# Patient Record
Sex: MALE | Race: WHITE | NOT HISPANIC OR LATINO | Employment: OTHER
[De-identification: names, ages, dates, MRNs, and addresses within clinical notes are randomized per-mention and may not be internally consistent; named-entity substitution may affect disease eponyms.]

---

## 2022-11-20 ENCOUNTER — HOSPITAL ENCOUNTER (OUTPATIENT)
Dept: DATA CONVERSION | Age: 67
End: 2022-11-20

## 2023-10-13 ENCOUNTER — TELEMEDICINE (OUTPATIENT)
Dept: PRIMARY CARE | Facility: CLINIC | Age: 68
End: 2023-10-13
Payer: MEDICARE

## 2023-10-13 DIAGNOSIS — K57.92 DIVERTICULITIS: Primary | ICD-10-CM

## 2023-10-13 PROBLEM — Z22.7 LATENT TUBERCULOSIS BY BLOOD TEST: Status: ACTIVE | Noted: 2023-10-13

## 2023-10-13 PROBLEM — M06.9 RHEUMATOID ARTHRITIS OF HAND (MULTI): Status: ACTIVE | Noted: 2023-10-13

## 2023-10-13 PROBLEM — M65.30 ACQUIRED TRIGGER FINGER: Status: ACTIVE | Noted: 2023-10-13

## 2023-10-13 PROBLEM — Z96.649 S/P HIP REPLACEMENT: Status: ACTIVE | Noted: 2023-10-13

## 2023-10-13 PROBLEM — E78.2 MIXED HYPERLIPIDEMIA: Status: ACTIVE | Noted: 2023-10-13

## 2023-10-13 PROBLEM — R10.814 LLQ ABDOMINAL TENDERNESS: Status: ACTIVE | Noted: 2023-10-13

## 2023-10-13 PROBLEM — I48.0 PAROXYSMAL ATRIAL FIBRILLATION (MULTI): Status: ACTIVE | Noted: 2023-02-25

## 2023-10-13 PROBLEM — G25.0 BENIGN ESSENTIAL TREMOR: Status: ACTIVE | Noted: 2023-10-13

## 2023-10-13 PROBLEM — M47.817 LUMBOSACRAL SPONDYLOSIS WITHOUT MYELOPATHY: Status: ACTIVE | Noted: 2023-10-13

## 2023-10-13 PROBLEM — F17.200 TOBACCO USE DISORDER: Status: ACTIVE | Noted: 2023-10-13

## 2023-10-13 PROBLEM — J84.10 LUNG FIBROSIS (MULTI): Status: ACTIVE | Noted: 2023-10-13

## 2023-10-13 PROBLEM — M50.30 DEGENERATION OF CERVICAL INTERVERTEBRAL DISC: Status: ACTIVE | Noted: 2023-10-13

## 2023-10-13 PROBLEM — E55.9 VITAMIN D DEFICIENCY: Status: ACTIVE | Noted: 2023-10-13

## 2023-10-13 PROBLEM — F41.8 DEPRESSION WITH ANXIETY: Status: ACTIVE | Noted: 2023-10-13

## 2023-10-13 PROCEDURE — 99213 OFFICE O/P EST LOW 20 MIN: CPT | Performed by: FAMILY MEDICINE

## 2023-10-13 RX ORDER — ALBUTEROL SULFATE 0.83 MG/ML
2.5 SOLUTION RESPIRATORY (INHALATION) EVERY 4 HOURS PRN
COMMUNITY
End: 2024-04-08 | Stop reason: ALTCHOICE

## 2023-10-13 RX ORDER — CIPROFLOXACIN 500 MG/1
500 TABLET ORAL 2 TIMES DAILY
Qty: 14 TABLET | Refills: 0 | Status: SHIPPED | OUTPATIENT
Start: 2023-10-13 | End: 2023-10-20

## 2023-10-13 RX ORDER — PRIMIDONE 50 MG/1
50 TABLET ORAL NIGHTLY
COMMUNITY
End: 2023-12-20 | Stop reason: SDUPTHER

## 2023-10-13 RX ORDER — ETHAMBUTOL HYDROCHLORIDE 400 MG/1
1600 TABLET, FILM COATED ORAL DAILY
COMMUNITY

## 2023-10-13 RX ORDER — METRONIDAZOLE 500 MG/1
500 TABLET ORAL 2 TIMES DAILY
Qty: 14 TABLET | Refills: 0 | Status: SHIPPED | OUTPATIENT
Start: 2023-10-13 | End: 2023-10-20

## 2023-10-13 RX ORDER — RIFAMPIN 300 MG/1
1 CAPSULE ORAL 2 TIMES DAILY
COMMUNITY
Start: 2023-03-15

## 2023-10-13 RX ORDER — AZITHROMYCIN 500 MG/1
500 TABLET, FILM COATED ORAL DAILY
COMMUNITY
Start: 2023-03-15 | End: 2024-03-14

## 2023-10-13 RX ORDER — OXYCODONE AND ACETAMINOPHEN 7.5; 325 MG/1; MG/1
1 TABLET ORAL 4 TIMES DAILY
COMMUNITY
End: 2023-10-17 | Stop reason: SDUPTHER

## 2023-10-13 RX ORDER — APIXABAN 5 MG/1
5 TABLET, FILM COATED ORAL 2 TIMES DAILY
COMMUNITY
End: 2024-04-08 | Stop reason: ALTCHOICE

## 2023-10-13 NOTE — PROGRESS NOTES
Subjective   Patient ID: Salvador Ocampo is a 68 y.o. male who presents for No chief complaint on file..  HPI  Complains of LLQ abd pain x2d   Seen today  Presents with a complaint of a 2-day history of left lower quadrant pain.  Patient has a history of diverticulitis.  He developed some diarrhea.  No blood in the stool.  No mucus in the stool.  No nausea or vomiting fever or chills.  Denies any urinary symptoms.    Review of Systems   Constitutional:  Negative for appetite change, fever and unexpected weight change.   HENT:  Negative for congestion and trouble swallowing.    Eyes:  Negative for visual disturbance.   Respiratory:  Negative for shortness of breath.    Cardiovascular:  Negative for chest pain, palpitations and leg swelling.   Gastrointestinal:  Positive for diarrhea. Negative for blood in stool, constipation, nausea, rectal pain and vomiting.   Genitourinary:  Negative for difficulty urinating and hematuria.   Musculoskeletal:  Negative for gait problem and joint swelling.   Skin:  Negative for color change.   Allergic/Immunologic: Negative for immunocompromised state.   Neurological:  Negative for seizures and syncope.   Psychiatric/Behavioral:  Negative for confusion and suicidal ideas. The patient is not nervous/anxious.        Objective   There were no vitals taken for this visit.    Physical Exam    Assessment/Plan   Problem List Items Addressed This Visit       Diverticulitis - Primary    Relevant Medications    ciprofloxacin (Cipro) 500 mg tablet    metroNIDAZOLE (Flagyl) 500 mg tablet     Assessment:  -Rheumatoid arthritis: rheum following  -Atrial fibrillation:   -Essential tremor   -hx of latent TB  -hx of lumbar and cervical surgery  -melanoma: stage 1- derm following  -+ 3.3cm AAA- followed by vascular    -Erectile dysfunction  -Recurrent fever: Mycobacteria  -Diverticulitis:  Discussed the usefulness of outpatient antibiotics.  Patient would like to start it start Cipro  and Flagyl at this time we will hold the Zithromax.    Spent approximately 10 minutes on the phone

## 2023-10-15 ASSESSMENT — ENCOUNTER SYMPTOMS
FEVER: 0
SEIZURES: 0
NAUSEA: 0
VOMITING: 0
DIARRHEA: 1
PALPITATIONS: 0
HEMATURIA: 0
CONFUSION: 0
JOINT SWELLING: 0
BLOOD IN STOOL: 0
SHORTNESS OF BREATH: 0
COLOR CHANGE: 0
RECTAL PAIN: 0
TROUBLE SWALLOWING: 0
UNEXPECTED WEIGHT CHANGE: 0
CONSTIPATION: 0
NERVOUS/ANXIOUS: 0
APPETITE CHANGE: 0
DIFFICULTY URINATING: 0

## 2023-10-15 ASSESSMENT — PATIENT HEALTH QUESTIONNAIRE - PHQ9
SUM OF ALL RESPONSES TO PHQ9 QUESTIONS 1 AND 2: 0
1. LITTLE INTEREST OR PLEASURE IN DOING THINGS: NOT AT ALL
2. FEELING DOWN, DEPRESSED OR HOPELESS: NOT AT ALL

## 2023-10-17 DIAGNOSIS — M06.9 RHEUMATOID ARTHRITIS INVOLVING BOTH HANDS, UNSPECIFIED WHETHER RHEUMATOID FACTOR PRESENT (MULTI): Primary | ICD-10-CM

## 2023-10-19 RX ORDER — OXYCODONE AND ACETAMINOPHEN 7.5; 325 MG/1; MG/1
1 TABLET ORAL 4 TIMES DAILY
Qty: 120 TABLET | Refills: 0 | Status: SHIPPED | OUTPATIENT
Start: 2023-10-19 | End: 2023-11-16 | Stop reason: SDUPTHER

## 2023-11-06 DIAGNOSIS — M06.9 RHEUMATOID ARTHRITIS INVOLVING BOTH HANDS, UNSPECIFIED WHETHER RHEUMATOID FACTOR PRESENT (MULTI): ICD-10-CM

## 2023-11-17 RX ORDER — OXYCODONE AND ACETAMINOPHEN 7.5; 325 MG/1; MG/1
1 TABLET ORAL 4 TIMES DAILY
Qty: 120 TABLET | Refills: 0 | Status: SHIPPED | OUTPATIENT
Start: 2023-11-17 | End: 2023-12-20 | Stop reason: SDUPTHER

## 2023-12-19 DIAGNOSIS — M06.9 RHEUMATOID ARTHRITIS INVOLVING BOTH HANDS, UNSPECIFIED WHETHER RHEUMATOID FACTOR PRESENT (MULTI): ICD-10-CM

## 2023-12-19 RX ORDER — OXYCODONE AND ACETAMINOPHEN 7.5; 325 MG/1; MG/1
1 TABLET ORAL 4 TIMES DAILY
Qty: 120 TABLET | Refills: 0 | Status: CANCELLED | OUTPATIENT
Start: 2023-12-19 | End: 2024-01-18

## 2023-12-20 ENCOUNTER — TELEPHONE (OUTPATIENT)
Dept: NEUROLOGY | Facility: CLINIC | Age: 68
End: 2023-12-20
Payer: MEDICARE

## 2023-12-20 DIAGNOSIS — G25.0 BENIGN ESSENTIAL TREMOR: Primary | ICD-10-CM

## 2023-12-20 RX ORDER — OXYCODONE AND ACETAMINOPHEN 7.5; 325 MG/1; MG/1
1 TABLET ORAL EVERY 6 HOURS PRN
Qty: 100 TABLET | Refills: 0 | Status: SHIPPED | OUTPATIENT
Start: 2023-12-20 | End: 2024-01-19 | Stop reason: WASHOUT

## 2023-12-20 RX ORDER — PRIMIDONE 50 MG/1
50 TABLET ORAL NIGHTLY
Qty: 90 TABLET | Refills: 0 | Status: SHIPPED | OUTPATIENT
Start: 2023-12-20 | End: 2024-05-15 | Stop reason: SDUPTHER

## 2023-12-20 NOTE — TELEPHONE ENCOUNTER
Reviewed OARRS and noticed that for the last 2 prescriptions was given #120.  We had reduced down to 100 and with migration of prescriptions from old EMR to [facility] old prescription of 4 times a day was on med list.  Will put down number dispense back to 100

## 2024-01-04 ENCOUNTER — OFFICE VISIT (OUTPATIENT)
Dept: RHEUMATOLOGY | Facility: CLINIC | Age: 69
End: 2024-01-04
Payer: MEDICARE

## 2024-01-04 VITALS
BODY MASS INDEX: 27.86 KG/M2 | HEIGHT: 71 IN | WEIGHT: 199 LBS | DIASTOLIC BLOOD PRESSURE: 73 MMHG | SYSTOLIC BLOOD PRESSURE: 132 MMHG

## 2024-01-04 DIAGNOSIS — M13.80 SERONEGATIVE INFLAMMATORY ARTHRITIS: Primary | ICD-10-CM

## 2024-01-04 DIAGNOSIS — Z79.891 ENCOUNTER FOR LONG-TERM OPIATE ANALGESIC USE: ICD-10-CM

## 2024-01-04 PROCEDURE — 3075F SYST BP GE 130 - 139MM HG: CPT | Performed by: INTERNAL MEDICINE

## 2024-01-04 PROCEDURE — 1159F MED LIST DOCD IN RCRD: CPT | Performed by: INTERNAL MEDICINE

## 2024-01-04 PROCEDURE — 3078F DIAST BP <80 MM HG: CPT | Performed by: INTERNAL MEDICINE

## 2024-01-04 PROCEDURE — 99214 OFFICE O/P EST MOD 30 MIN: CPT | Performed by: INTERNAL MEDICINE

## 2024-01-04 ASSESSMENT — ENCOUNTER SYMPTOMS
SLEEP DISTURBANCE: 1
FATIGUE: 1

## 2024-01-04 NOTE — PROGRESS NOTES
Subjective   Patient ID: Salvador Ocampo is a 68 y.o. male who presents for Rheumatoid Arthritis (4 month follow up ).  HPI  Patient with history of seronegative rheumatoid arthritis with also history of positive QuantiFERON TB Gold status post treatment.  Had only been on 1 dose of Enbrel before being diagnosed with melanoma.  Has been on Orencia and Rituxan.  Last Rituxan infusion in late 2022.  History of MAC infection.  Follows up again with infectious disease in April 2024    Patient is accompanied by his wife today.    At his last visit we continued him on oxycodone/acetaminophen 7.5/325.  They will be leaving for [place] next week and then will leave from there to [facility] for 6 weeks.  He will be back in [place] at the end of February and back in [place] in March.    He did recently see pulmonology and had new PFTs which had improved.    His back and shoulders have been bothering him.    His fingers in the morning are very stiff.  Very difficult to open up in the morning.    He has been using his oxycodone 3 times a day usually.  Review of Systems   Constitutional:  Positive for fatigue.   Respiratory:          Breathing has improved   Musculoskeletal:         Per HPI   Psychiatric/Behavioral:  Positive for sleep disturbance.        Objective   Physical Exam  GEN: NAD A&O x3 appropriate affect  HENT:no enlarged glands or thyroid  EYES: no conjunctival redness, eyelids normal  LYMPH: no cervical lymphadenopathy  SKIN: no rashes, psoriasis or nail pitting  PULSES: +radials  TENDER POINTS: 0/18   MSK:   No current synovitis in joints of the DIP PIP MCP wrist elbows shoulders knees or ankles.  Discomfort with range of motion of bilateral shoulders    Assessment/Plan     Seronegative rheumatoid arthritis with positive quantiferon TB gold has been treated. Patient with history of melanoma. He was only on Enbrel for 1 month before being diagnosed with melanoma . Has been on orenica previously. His last Rituxan  was in late 2022. Now with MAC infection and currently on antibiotic he says for total of 24 months.   -Complains of mostly stiffness in the morning in his hands.  Has symptoms in his shoulders and lower back.      Currently on chronic narcotic therapy which I feel is appropriate at this time.  He is up-to-date urine drug screen and controlled substance contract that is on file.  He will be leaving for [facility] soon.  Has been trying to just take oxycodone 3 times a day and does have some extra to take next week.     Will see him again in 3 months or as needed    Suni Chery MD 01/04/24 11:49 AM

## 2024-02-28 ENCOUNTER — TELEPHONE (OUTPATIENT)
Dept: RHEUMATOLOGY | Facility: CLINIC | Age: 69
End: 2024-02-28
Payer: MEDICARE

## 2024-02-28 DIAGNOSIS — M13.80 SERONEGATIVE INFLAMMATORY ARTHRITIS: Primary | ICD-10-CM

## 2024-02-29 RX ORDER — OXYCODONE AND ACETAMINOPHEN 7.5; 325 MG/1; MG/1
1 TABLET ORAL EVERY 6 HOURS PRN
Qty: 100 TABLET | Refills: 0 | Status: SHIPPED | OUTPATIENT
Start: 2024-02-29 | End: 2024-04-01 | Stop reason: SDUPTHER

## 2024-04-01 DIAGNOSIS — M13.80 SERONEGATIVE INFLAMMATORY ARTHRITIS: ICD-10-CM

## 2024-04-02 RX ORDER — OXYCODONE AND ACETAMINOPHEN 7.5; 325 MG/1; MG/1
1 TABLET ORAL EVERY 6 HOURS PRN
Qty: 100 TABLET | Refills: 0 | Status: SHIPPED | OUTPATIENT
Start: 2024-04-02 | End: 2024-04-29 | Stop reason: SDUPTHER

## 2024-04-08 ENCOUNTER — OFFICE VISIT (OUTPATIENT)
Dept: RHEUMATOLOGY | Facility: CLINIC | Age: 69
End: 2024-04-08
Payer: MEDICARE

## 2024-04-08 VITALS
HEART RATE: 65 BPM | DIASTOLIC BLOOD PRESSURE: 75 MMHG | HEIGHT: 71 IN | OXYGEN SATURATION: 95 % | BODY MASS INDEX: 28.31 KG/M2 | SYSTOLIC BLOOD PRESSURE: 130 MMHG | WEIGHT: 202.2 LBS

## 2024-04-08 DIAGNOSIS — Z79.891 ENCOUNTER FOR LONG-TERM OPIATE ANALGESIC USE: ICD-10-CM

## 2024-04-08 DIAGNOSIS — M13.80 SERONEGATIVE INFLAMMATORY ARTHRITIS: Primary | ICD-10-CM

## 2024-04-08 PROCEDURE — 99214 OFFICE O/P EST MOD 30 MIN: CPT | Performed by: INTERNAL MEDICINE

## 2024-04-08 PROCEDURE — 3075F SYST BP GE 130 - 139MM HG: CPT | Performed by: INTERNAL MEDICINE

## 2024-04-08 PROCEDURE — 1159F MED LIST DOCD IN RCRD: CPT | Performed by: INTERNAL MEDICINE

## 2024-04-08 PROCEDURE — 3078F DIAST BP <80 MM HG: CPT | Performed by: INTERNAL MEDICINE

## 2024-04-08 RX ORDER — AZITHROMYCIN 500 MG/1
500 TABLET, FILM COATED ORAL
COMMUNITY
Start: 2024-03-15

## 2024-04-08 ASSESSMENT — ENCOUNTER SYMPTOMS
SLEEP DISTURBANCE: 1
FATIGUE: 1

## 2024-04-08 NOTE — PROGRESS NOTES
Subjective   Patient ID: Salvador Ocampo is a 68 y.o. male who presents for Follow-up (3 mo fuv; UDS and CSA are UTD).  HPI  Patient with history of seronegative rheumatoid arthritis with also history of positive QuantiFERON TB Gold status post treatment.  Had only been on 1 dose of Enbrel before being diagnosed with melanoma.  Has been on Orencia and Rituxan.  Last Rituxan infusion in late 2022.  History of MAC infection.  Follows up again with infectious disease in April 2024    Patient was last seen in January 2024.  He was in [facility] for about 6 weeks and felt good there with the warmer weather.    He has had some flares off and on.  He has had some swelling in his hands and wrists and has had pain in his shoulders.    He is going to continue with his antibiotics for MAC infection until September.  He did have recent CT scan in my lot of the minding his are resolving.    Still has a lot of fatigue.  His breathing has been okay.  His feet and knees have been okay and his back has still been problematic.    Sometimes his pain can be 3-8/10 depending on what he is doing.  He tried going down to 2 a day of Percocet but had increased in symptoms symptoms.  Mostly taking 3 times a day.    He does follow with skin provider soon.  Just follows with him once a year and melanoma in the left upper extremity  Review of Systems   Constitutional:  Positive for fatigue.   Respiratory:          Breathing has improved   Musculoskeletal:         Per HPI   Psychiatric/Behavioral:  Positive for sleep disturbance.        Objective   Physical Exam  GEN: NAD A&O x3 appropriate affect  HENT:no enlarged glands or thyroid  EYES: no conjunctival redness, eyelids normal  LYMPH: no cervical lymphadenopathy  SKIN: no rashes, psoriasis or nail pitting  PULSES: +radials  TENDER POINTS: 0/18   MSK:   No current synovitis in joints of the DIP PIP MCP wrist elbows shoulders knees or ankles.  Discomfort with range of motion of bilateral  shoulders    Assessment/Plan     Seronegative rheumatoid arthritis with positive quantiferon TB gold has been treated. Patient with history of melanoma. He was only on Enbrel for 1 month before being diagnosed with melanoma . Has been on orenica previously. His last Rituxan was in late 2022. Now with MAC infection and currently on antibiotic he says for total of 24 months.  He has antibiotics until September 2024  -Has had flares in his hands and shoulders.  Has also chronic pain in his lower back.  Reviewed his recent blood work which showed a mild elevation of CRP but normal ESR.  Patient is wondering what the elevation of alkaline phosphatase seen and discussed Of different sources of alkaline phosphatase.  Told him that it could become from white blood cell, liver, bone,    Currently on chronic narcotic therapy which I feel is appropriate at this time.  He is up-to-date urine drug screen and controlled substance contract that is on file.        Will see him again in 3 months or as needed    OARRS:  Suni Chery MD on 4/8/2024 11:42 AM  I have personally reviewed the OARRS report for Salvador COOK Ocampo. I have considered the risks of abuse, dependence, addiction and diversion    Is the patient prescribed a combination of a benzodiazepine and opioid?  Yes, I feel it is clincially indicated to continue the medication and have discussed with the patient risks/benefits/alternatives.    Last Urine Drug Screen / ordered today: May 2023  No results found for this or any previous visit (from the past 8760 hour(s)).  Results are as expected.     Controlled Substance Agreement:  Date of the Last Agreement: May 20, 2023  Reviewed Controlled Substance Agreement including but not limited to the benefits, risks, and alternatives to treatment with a Controlled Substance medication(s).    Opioids:  What is the patient's goal of therapy?  Help with pain  Is this being achieved with current treatment?  Help with pain  I feel  that it is clinically indicated to continue this current medication regimen after consideration of alternative therapies, and other non-opioid treatment.    Opioid Risk Screening:  No data recorded    Pain Assessment:  No data recorded    Suni Chery MD 04/08/24 11:33 AM

## 2024-04-29 DIAGNOSIS — M13.80 SERONEGATIVE INFLAMMATORY ARTHRITIS: ICD-10-CM

## 2024-04-29 RX ORDER — OXYCODONE AND ACETAMINOPHEN 7.5; 325 MG/1; MG/1
1 TABLET ORAL EVERY 6 HOURS PRN
Qty: 100 TABLET | Refills: 0 | Status: SHIPPED | OUTPATIENT
Start: 2024-04-30 | End: 2024-05-29 | Stop reason: SDUPTHER

## 2024-05-15 DIAGNOSIS — G25.0 BENIGN ESSENTIAL TREMOR: ICD-10-CM

## 2024-05-15 RX ORDER — PRIMIDONE 50 MG/1
50 TABLET ORAL NIGHTLY
Qty: 90 TABLET | Refills: 0 | Status: SHIPPED | OUTPATIENT
Start: 2024-05-15 | End: 2024-08-13

## 2024-05-22 ENCOUNTER — TELEPHONE (OUTPATIENT)
Dept: PRIMARY CARE | Facility: CLINIC | Age: 69
End: 2024-05-22
Payer: MEDICARE

## 2024-05-22 NOTE — TELEPHONE ENCOUNTER
Soonest with Dr. Jackson is June 18th at 3. If he doesn't want to wait that long he would have to see Hamida or another joselo

## 2024-05-29 ENCOUNTER — HOSPITAL ENCOUNTER (OUTPATIENT)
Dept: RADIOLOGY | Facility: CLINIC | Age: 69
Discharge: HOME | End: 2024-05-29
Payer: MEDICARE

## 2024-05-29 ENCOUNTER — OFFICE VISIT (OUTPATIENT)
Dept: PRIMARY CARE | Facility: CLINIC | Age: 69
End: 2024-05-29
Payer: MEDICARE

## 2024-05-29 VITALS
WEIGHT: 202 LBS | HEART RATE: 77 BPM | SYSTOLIC BLOOD PRESSURE: 123 MMHG | BODY MASS INDEX: 28.28 KG/M2 | OXYGEN SATURATION: 99 % | DIASTOLIC BLOOD PRESSURE: 74 MMHG | HEIGHT: 71 IN

## 2024-05-29 DIAGNOSIS — G89.29 CHRONIC LEFT-SIDED LOW BACK PAIN WITH LEFT-SIDED SCIATICA: ICD-10-CM

## 2024-05-29 DIAGNOSIS — M54.42 CHRONIC LEFT-SIDED LOW BACK PAIN WITH LEFT-SIDED SCIATICA: Primary | ICD-10-CM

## 2024-05-29 DIAGNOSIS — M13.80 SERONEGATIVE INFLAMMATORY ARTHRITIS: ICD-10-CM

## 2024-05-29 DIAGNOSIS — G89.29 CHRONIC LEFT-SIDED LOW BACK PAIN WITH LEFT-SIDED SCIATICA: Primary | ICD-10-CM

## 2024-05-29 DIAGNOSIS — M54.42 CHRONIC LEFT-SIDED LOW BACK PAIN WITH LEFT-SIDED SCIATICA: ICD-10-CM

## 2024-05-29 PROCEDURE — 1160F RVW MEDS BY RX/DR IN RCRD: CPT

## 2024-05-29 PROCEDURE — 3074F SYST BP LT 130 MM HG: CPT

## 2024-05-29 PROCEDURE — 72114 X-RAY EXAM L-S SPINE BENDING: CPT | Performed by: RADIOLOGY

## 2024-05-29 PROCEDURE — 3078F DIAST BP <80 MM HG: CPT

## 2024-05-29 PROCEDURE — 72114 X-RAY EXAM L-S SPINE BENDING: CPT

## 2024-05-29 PROCEDURE — 99213 OFFICE O/P EST LOW 20 MIN: CPT

## 2024-05-29 PROCEDURE — 1159F MED LIST DOCD IN RCRD: CPT

## 2024-05-29 RX ORDER — OXYCODONE AND ACETAMINOPHEN 7.5; 325 MG/1; MG/1
1 TABLET ORAL EVERY 6 HOURS PRN
Qty: 100 TABLET | Refills: 0 | Status: SHIPPED | OUTPATIENT
Start: 2024-05-29 | End: 2024-06-28

## 2024-05-29 ASSESSMENT — PATIENT HEALTH QUESTIONNAIRE - PHQ9
1. LITTLE INTEREST OR PLEASURE IN DOING THINGS: NOT AT ALL
SUM OF ALL RESPONSES TO PHQ9 QUESTIONS 1 AND 2: 0
2. FEELING DOWN, DEPRESSED OR HOPELESS: NOT AT ALL

## 2024-05-29 NOTE — PROGRESS NOTES
Subjective   Patient ID: Salvador Ocampo is a 68 y.o. male who presents for Back Pain (Lt leg numbness and weakness for 1 month).  AUTUMN Franco presents with his wife for L leg numbness and weakness that he has had for 1 month.  He states that he has back pain and leg numbness and foot numbness. It hurts to walk on the L side.  He has a history of 3 lumbar surgeries. 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  He also has RA and takes pain medication from rheumatology - this has not helped the pain in his back that he is having.   The pain shoots out of his L hip.  He says he has been sitting with his heating pad daily on his back and it has not provided any relief.   The L leg numbness has been getting worse in the last month.  Big toenail on L foot is black and blue. He does not recall dropping anyhthing or banging his toe on anything.  He states that his L big toe does not hurt but it is numb so unknown if it hurts.   The numbness is much more now, it is steadily getting worse in his L leg and L foot.   He states that 2 nerves were severed from the original surgery - L pinky toe has always been numb, but now it is the whole lower leg, all the way to the big toe that is numb - this is new in the past month.   The numbness and pain is affecting how he is walking.  No loss of bowel or bladder function.  Constant numbness.  Does not hurt when sitting but hurts when standing.  Cannot stand still due to feeling unstable.  He has continued to move around, do activities, and exercises at home with no relief of the pain or numbness.   R leg and foot are normal.     Past Surgical History:   Procedure Laterality Date    APPENDECTOMY  05/14/2018    Appendectomy    CERVICAL FUSION  05/14/2018    Cervical Vertebral Fusion    LUMBAR LAMINECTOMY  05/14/2018    Laminectomy Lumbar    OTHER SURGICAL HISTORY  05/14/2018    Hip Replacement Bilateral    OTHER SURGICAL HISTORY  02/15/2022    Tibia fracture  "repair    OTHER SURGICAL HISTORY  11/13/2018    Excision melanoma    OTHER SURGICAL HISTORY  11/13/2018    Ureteral stent placement      Past Medical History:   Diagnosis Date    Laceration without foreign body of right thumb with damage to nail, initial encounter 06/13/2019    Laceration of right thumb without foreign body with damage to nail, initial encounter    Personal history of malignant melanoma of skin 10/08/2020    History of malignant melanoma of skin    Personal history of other diseases of the circulatory system     History of hypertension    Personal history of other diseases of the musculoskeletal system and connective tissue     History of rheumatoid arthritis    Personal history of other specified conditions 04/01/2019    History of diarrhea     Social History     Tobacco Use    Smoking status: Every Day     Types: Cigarettes    Smokeless tobacco: Never        Review of Systems  10 point review of systems performed and is negative except as noted in the HPI.      Current Outpatient Medications:     azithromycin (Zithromax) 500 mg tablet, Take 1 tablet (500 mg) by mouth once a day on Monday, Wednesday, and Friday., Disp: , Rfl:     ethambutol (Myambutol) 400 mg tablet, Take 4 tablets (1,600 mg) by mouth once daily., Disp: , Rfl:     primidone (Mysoline) 50 mg tablet, Take 1 tablet (50 mg) by mouth once daily at bedtime., Disp: 90 tablet, Rfl: 0    rifAMPin (Rifadin) 300 mg capsule, Take 1 capsule (300 mg) by mouth 2 times a day., Disp: , Rfl:     oxyCODONE-acetaminophen (Percocet) 7.5-325 mg tablet, Take 1 tablet by mouth every 6 hours if needed for severe pain (7 - 10)., Disp: 100 tablet, Rfl: 0     Objective   /74   Pulse 77   Ht 1.803 m (5' 11\")   Wt 91.6 kg (202 lb)   SpO2 99%   BMI 28.17 kg/m²     Physical Exam  Vitals reviewed.   Constitutional:       General: He is not in acute distress.     Appearance: Normal appearance. He is not ill-appearing.   HENT:      Head: Normocephalic and " atraumatic.   Eyes:      Conjunctiva/sclera: Conjunctivae normal.      Pupils: Pupils are equal, round, and reactive to light.   Cardiovascular:      Rate and Rhythm: Normal rate and regular rhythm.      Heart sounds: Normal heart sounds.   Pulmonary:      Effort: Pulmonary effort is normal.      Breath sounds: Normal breath sounds. No wheezing, rhonchi or rales.   Musculoskeletal:      Thoracic back: Normal.      Lumbar back: Tenderness (L side) present. Decreased range of motion (flexion and extension). Positive left straight leg raise test. Negative right straight leg raise test.      Right hip: Normal. No tenderness or crepitus. Normal range of motion.      Left hip: Tenderness present. No crepitus. Decreased range of motion (adduction and abduction).      Left ankle: Normal pulse.      Left foot: Normal range of motion and normal capillary refill. No swelling or tenderness. Normal pulse.   Neurological:      Mental Status: He is alert and oriented to person, place, and time.      Gait: Gait abnormal.         Assessment/Plan   Problem List Items Addressed This Visit    None  Visit Diagnoses       Chronic left-sided low back pain with left-sided sciatica    -  Primary    Relevant Orders    XR lumbar spine 6+ views including oblique flexion extension (Completed)          L sided low back pain w/ L sided sciatica   Chronic issue but recent worsening in the past month   Patient is on percocet for RA - does not help the back pain   Has been using heat on his back daily - has not helped the pain   He has failed conservative treatment - likely needs referral to ortho spine for further eval   History of lumbar and cervical surgery   Xray of lumbar spine ordered today - will wait for results     Discussed at visit any disease processes that were of concern as well as the risks, benefits and instructions on any new medication provided. Patient (and/or caretaker of patient if present) stated all questions were answered,  and they voiced understanding of instructions.

## 2024-05-30 DIAGNOSIS — I71.40 ABDOMINAL AORTIC ANEURYSM (AAA) 3.0 CM TO 5.5 CM IN DIAMETER IN MALE (CMS-HCC): Primary | ICD-10-CM

## 2024-05-30 DIAGNOSIS — M47.26 OSTEOARTHRITIS OF SPINE WITH RADICULOPATHY, LUMBAR REGION: ICD-10-CM

## 2024-05-30 DIAGNOSIS — G89.29 CHRONIC LEFT-SIDED LOW BACK PAIN WITH LEFT-SIDED SCIATICA: ICD-10-CM

## 2024-05-30 DIAGNOSIS — M47.817 LUMBOSACRAL SPONDYLOSIS WITHOUT MYELOPATHY: ICD-10-CM

## 2024-05-30 DIAGNOSIS — M54.42 CHRONIC LEFT-SIDED LOW BACK PAIN WITH LEFT-SIDED SCIATICA: ICD-10-CM

## 2024-06-05 ENCOUNTER — APPOINTMENT (OUTPATIENT)
Dept: VASCULAR SURGERY | Facility: HOSPITAL | Age: 69
End: 2024-06-05
Payer: MEDICARE

## 2024-06-17 ENCOUNTER — TELEPHONE (OUTPATIENT)
Dept: PRIMARY CARE | Facility: CLINIC | Age: 69
End: 2024-06-17
Payer: MEDICARE

## 2024-06-17 NOTE — TELEPHONE ENCOUNTER
Wife called to update you. Pt is having his MRI of his lumbar spine tomorrow, they have chosen Arie Petersen as the neurosurgeon. Dr. Petersen would like us to fax over any pertinent information relating to this to [phone].    Also, she wanted to be sure that Dr. Mosqueda updated you that Salvador's ultrasound was good, his measurements were acceptable and is all clear for his AAA.

## 2024-06-18 ENCOUNTER — HOSPITAL ENCOUNTER (OUTPATIENT)
Dept: RADIOLOGY | Facility: HOSPITAL | Age: 69
Discharge: HOME | End: 2024-06-18
Payer: MEDICARE

## 2024-06-18 DIAGNOSIS — M47.26 OSTEOARTHRITIS OF SPINE WITH RADICULOPATHY, LUMBAR REGION: ICD-10-CM

## 2024-06-18 DIAGNOSIS — M54.42 CHRONIC LEFT-SIDED LOW BACK PAIN WITH LEFT-SIDED SCIATICA: ICD-10-CM

## 2024-06-18 DIAGNOSIS — G89.29 CHRONIC LEFT-SIDED LOW BACK PAIN WITH LEFT-SIDED SCIATICA: ICD-10-CM

## 2024-06-18 DIAGNOSIS — M47.817 LUMBOSACRAL SPONDYLOSIS WITHOUT MYELOPATHY: ICD-10-CM

## 2024-06-18 PROCEDURE — 72158 MRI LUMBAR SPINE W/O & W/DYE: CPT | Performed by: RADIOLOGY

## 2024-06-18 PROCEDURE — 72158 MRI LUMBAR SPINE W/O & W/DYE: CPT

## 2024-06-18 PROCEDURE — A9575 INJ GADOTERATE MEGLUMI 0.1ML: HCPCS

## 2024-06-18 PROCEDURE — 2550000001 HC RX 255 CONTRASTS

## 2024-06-18 RX ORDER — GADOTERATE MEGLUMINE 376.9 MG/ML
18 INJECTION INTRAVENOUS
Status: COMPLETED | OUTPATIENT
Start: 2024-06-18 | End: 2024-06-18

## 2024-06-18 ASSESSMENT — ENCOUNTER SYMPTOMS
LOSS OF SENSATION IN FEET: 0
DEPRESSION: 0
OCCASIONAL FEELINGS OF UNSTEADINESS: 0

## 2024-06-27 DIAGNOSIS — M13.80 SERONEGATIVE INFLAMMATORY ARTHRITIS: ICD-10-CM

## 2024-06-27 RX ORDER — OXYCODONE AND ACETAMINOPHEN 7.5; 325 MG/1; MG/1
1 TABLET ORAL EVERY 6 HOURS PRN
Qty: 100 TABLET | Refills: 0 | Status: SHIPPED | OUTPATIENT
Start: 2024-06-27 | End: 2024-07-27

## 2024-06-27 RX ORDER — OXYCODONE AND ACETAMINOPHEN 7.5; 325 MG/1; MG/1
1 TABLET ORAL EVERY 6 HOURS PRN
Qty: 100 TABLET | Refills: 0 | Status: CANCELLED | OUTPATIENT
Start: 2024-06-27 | End: 2024-07-27

## 2024-07-29 ENCOUNTER — TELEPHONE (OUTPATIENT)
Dept: RHEUMATOLOGY | Facility: CLINIC | Age: 69
End: 2024-07-29
Payer: MEDICARE

## 2024-07-29 DIAGNOSIS — M13.80 SERONEGATIVE INFLAMMATORY ARTHRITIS: ICD-10-CM

## 2024-07-29 RX ORDER — OXYCODONE AND ACETAMINOPHEN 7.5; 325 MG/1; MG/1
1 TABLET ORAL EVERY 6 HOURS PRN
Qty: 100 TABLET | Refills: 0 | Status: SHIPPED | OUTPATIENT
Start: 2024-07-29 | End: 2024-08-28

## 2024-07-30 DIAGNOSIS — G89.29 CHRONIC LEFT-SIDED LOW BACK PAIN WITH LEFT-SIDED SCIATICA: Primary | ICD-10-CM

## 2024-07-30 DIAGNOSIS — M54.42 CHRONIC LEFT-SIDED LOW BACK PAIN WITH LEFT-SIDED SCIATICA: Primary | ICD-10-CM

## 2024-07-30 RX ORDER — METHYLPREDNISOLONE 4 MG/1
TABLET ORAL
Qty: 21 TABLET | Refills: 0 | Status: SHIPPED | OUTPATIENT
Start: 2024-07-30 | End: 2024-08-06

## 2024-08-05 ENCOUNTER — APPOINTMENT (OUTPATIENT)
Dept: RHEUMATOLOGY | Facility: CLINIC | Age: 69
End: 2024-08-05
Payer: MEDICARE

## 2024-08-05 VITALS
SYSTOLIC BLOOD PRESSURE: 122 MMHG | HEART RATE: 85 BPM | BODY MASS INDEX: 27.72 KG/M2 | WEIGHT: 198 LBS | DIASTOLIC BLOOD PRESSURE: 78 MMHG

## 2024-08-05 DIAGNOSIS — Z79.891 ENCOUNTER FOR LONG-TERM OPIATE ANALGESIC USE: ICD-10-CM

## 2024-08-05 DIAGNOSIS — M47.817 LUMBOSACRAL SPONDYLOSIS WITHOUT MYELOPATHY: Primary | ICD-10-CM

## 2024-08-05 DIAGNOSIS — M13.80 SERONEGATIVE INFLAMMATORY ARTHRITIS: ICD-10-CM

## 2024-08-05 PROCEDURE — 1159F MED LIST DOCD IN RCRD: CPT | Performed by: INTERNAL MEDICINE

## 2024-08-05 PROCEDURE — 99214 OFFICE O/P EST MOD 30 MIN: CPT | Performed by: INTERNAL MEDICINE

## 2024-08-05 PROCEDURE — 3078F DIAST BP <80 MM HG: CPT | Performed by: INTERNAL MEDICINE

## 2024-08-05 PROCEDURE — 3074F SYST BP LT 130 MM HG: CPT | Performed by: INTERNAL MEDICINE

## 2024-08-05 RX ORDER — METHYLPREDNISOLONE 4 MG/1
4-8 TABLET ORAL DAILY
Qty: 42 TABLET | Refills: 0 | Status: SHIPPED | OUTPATIENT
Start: 2024-08-05

## 2024-08-05 ASSESSMENT — ENCOUNTER SYMPTOMS
FATIGUE: 1
SLEEP DISTURBANCE: 1

## 2024-08-05 NOTE — PROGRESS NOTES
Subjective   Patient ID: Salvador Ocampo is a 69 y.o. male who presents for Follow-up (FUV for arthritis).  HPI  Patient with history of seronegative rheumatoid arthritis with also history of positive QuantiFERON TB Gold status post treatment.  Had only been on 1 dose of Enbrel before being diagnosed with melanoma.  Has been on Orencia and Rituxan.  Last Rituxan infusion in late 2022.  History of MAC infection.  Follows up again with infectious disease in April 2024      Patient was last seen in April and at that time we had him continue with narcotic therapy.  He continues to be treated for MAC infection and most likely will be off of antibiotics by the end of the year.  He has been having issues with lower extremity weakness and especially radiculopathy in his left lower extremity.  Is from his lower back into his buttocks and goes down his leg.  Most recently had been on a Medrol Dosepak which has been helpful for symptoms.  Will be going to [place] later this week to visit family for 2 weeks.  He has gotten the opinion of Neurosurgery who discussed conservative measures before being considered for possible surgery or spinal stimulator placement.  He will be doing physical therapy      Review of Systems   Constitutional:  Positive for fatigue.   Respiratory:          Breathing has improved   Musculoskeletal:         Per HPI   Psychiatric/Behavioral:  Positive for sleep disturbance.        Objective   Physical Exam  GEN: NAD A&O x3 appropriate affect    Assessment/Plan     Seronegative rheumatoid arthritis with positive quantiferon TB gold has been treated. Patient with history of melanoma. He was only on Enbrel for 1 month before being diagnosed with melanoma . Has been on orenica previously. His last Rituxan was in late 2022. Now with MAC infection and currently on antibiotic he says for total of 24 months.  He has antibiotics until September 2024  -The pain in his lower back has outside the rest of his joint  symptoms.  Currently has been signed up with physical therapy and may be considered for spinal stimulator.  Has seen neurosurgery.    -Patient is asking to be on continuous steroids to help his back and I deferred.  We have had problems with infections and reminded him of this.  He is just finishing off a Medrol Dosepak and will put in Medrol for his trip to [place].    Currently on chronic narcotic therapy which I feel is appropriate at this time.  Patient renewed his urine drug screen and controlled substance contract    Will see him again in 3 months or as needed    OARRS:  Suni Chery MD on 8/5/2024 11:35 AM  I have personally reviewed the OARRS report for Salvador Ocampo. I have considered the risks of abuse, dependence, addiction and diversion    Is the patient prescribed a combination of a benzodiazepine and opioid?  Yes, I feel it is clincially indicated to continue the medication and have discussed with the patient risks/benefits/alternatives.    Last Urine Drug Screen / ordered today:  8/5/2023    Controlled Substance Agreement:  Date of the Last Agreement: 8/5/2023  Reviewed Controlled Substance Agreement including but not limited to the benefits, risks, and alternatives to treatment with a Controlled Substance medication(s).    Opioids:  What is the patient's goal of therapy?  Help with pain  Is this being achieved with current treatment?  Help with pain  I feel that it is clinically indicated to continue this current medication regimen after consideration of alternative therapies, and other non-opioid treatment.    Opioid Risk Screening:  No data recorded    Pain Assessment:  No data recorded    Suni Chery MD 08/05/24 2:31 PM

## 2024-08-06 DIAGNOSIS — G25.0 BENIGN ESSENTIAL TREMOR: ICD-10-CM

## 2024-08-06 RX ORDER — PRIMIDONE 50 MG/1
50 TABLET ORAL NIGHTLY
Qty: 30 TABLET | Refills: 1 | Status: SHIPPED | OUTPATIENT
Start: 2024-08-06 | End: 2024-10-05

## 2024-08-30 DIAGNOSIS — M13.80 SERONEGATIVE INFLAMMATORY ARTHRITIS: ICD-10-CM

## 2024-08-30 RX ORDER — OXYCODONE AND ACETAMINOPHEN 7.5; 325 MG/1; MG/1
1 TABLET ORAL EVERY 6 HOURS PRN
Qty: 100 TABLET | Refills: 0 | Status: SHIPPED | OUTPATIENT
Start: 2024-08-30 | End: 2024-09-29

## 2024-09-03 ENCOUNTER — APPOINTMENT (OUTPATIENT)
Dept: NEUROLOGY | Facility: CLINIC | Age: 69
End: 2024-09-03
Payer: MEDICARE

## 2024-09-03 VITALS
TEMPERATURE: 97.3 F | HEART RATE: 80 BPM | BODY MASS INDEX: 29.18 KG/M2 | DIASTOLIC BLOOD PRESSURE: 65 MMHG | WEIGHT: 197 LBS | HEIGHT: 69 IN | SYSTOLIC BLOOD PRESSURE: 143 MMHG

## 2024-09-03 DIAGNOSIS — M54.16 LUMBAR RADICULOPATHY: ICD-10-CM

## 2024-09-03 DIAGNOSIS — G25.0 BENIGN ESSENTIAL TREMOR: Primary | ICD-10-CM

## 2024-09-03 PROCEDURE — 3078F DIAST BP <80 MM HG: CPT | Performed by: PSYCHIATRY & NEUROLOGY

## 2024-09-03 PROCEDURE — 3008F BODY MASS INDEX DOCD: CPT | Performed by: PSYCHIATRY & NEUROLOGY

## 2024-09-03 PROCEDURE — 3077F SYST BP >= 140 MM HG: CPT | Performed by: PSYCHIATRY & NEUROLOGY

## 2024-09-03 PROCEDURE — 1160F RVW MEDS BY RX/DR IN RCRD: CPT | Performed by: PSYCHIATRY & NEUROLOGY

## 2024-09-03 PROCEDURE — 1159F MED LIST DOCD IN RCRD: CPT | Performed by: PSYCHIATRY & NEUROLOGY

## 2024-09-03 PROCEDURE — 99214 OFFICE O/P EST MOD 30 MIN: CPT | Performed by: PSYCHIATRY & NEUROLOGY

## 2024-09-03 RX ORDER — PRIMIDONE 50 MG/1
50 TABLET ORAL NIGHTLY
Qty: 90 TABLET | Refills: 3 | Status: SHIPPED | OUTPATIENT
Start: 2024-09-03 | End: 2025-09-03

## 2024-09-03 RX ORDER — NORTRIPTYLINE HYDROCHLORIDE 10 MG/1
10 CAPSULE ORAL NIGHTLY
Qty: 30 CAPSULE | Refills: 3 | Status: SHIPPED | OUTPATIENT
Start: 2024-09-03 | End: 2025-01-01

## 2024-09-03 NOTE — PROGRESS NOTES
NEUROLOGY OUTPATIENT FOLLOW-UP NOTE    Assessment/Plan   Diagnoses and all orders for this visit:  Benign essential tremor  -     primidone (Mysoline) 50 mg tablet; Take 1 tablet (50 mg) by mouth once daily at bedtime.  Lumbar radiculopathy  -     nortriptyline (Pamelor) 10 mg capsule; Take 1 capsule (10 mg) by mouth once daily at bedtime.      IMPRESSION:  Stable benign essential tremor.  New symptoms and signs for left L5 radiculopathy, with neurogenic claudication.    PLAN:  I refilled primidone as it is effective for the tremor.  I advised the patient to follow up with his spine surgeon given lack of efficacy of conservative measures.  In the meantime, I started him on low-dose nortriptyline at 10 mg (he is sensitive to medication), with increase to 20 mg in two weeks if no change in his neuropathic symptoms.  I will see him again in three months or prn, but I encouraged him to call in the next 2-3 weeks with progress.      Benson Medley Jr., M.D., [facility]   ----------    Subjective     Salvador Ocampo is a 69 y.o. year old male here for follow-up of benign essential tremor.  Last visit was 01/2023.    Tremor remains stable on primidone 50 mg at bedtime.      He has a new complaint.  In the last six months, he has developed positional left buttock and calf pain.  He has a prior history of lumbar laminectomy.  About six months ago, he developed burning dysesthesias and sharp pain in the left buttock and thigh after walking for more than five minutes.  After sitting for 5-10 minutes, the pain resolves.  He presented to his surgeon, who recommended physical therapy.  This has not been helpful and in fact he has more discomfort.  He says he was tried on a low dose of gabapentin and after two bedtime doses, he found that it caused such cognitive symptoms that he stopped it.    He denies other focal neurological symptoms including dysarthria, dysphagia, diplopia, focal weakness, other focal sensory change,  ataxia, vertigo, or bowel/bladder incontinence, among others.      Past Medical History:   Diagnosis Date    Laceration without foreign body of right thumb with damage to nail, initial encounter 06/13/2019    Laceration of right thumb without foreign body with damage to nail, initial encounter    Personal history of malignant melanoma of skin 10/08/2020    History of malignant melanoma of skin    Personal history of other diseases of the circulatory system     History of hypertension    Personal history of other diseases of the musculoskeletal system and connective tissue     History of rheumatoid arthritis    Personal history of other specified conditions 04/01/2019    History of diarrhea     Past Surgical History:   Procedure Laterality Date    APPENDECTOMY  05/14/2018    Appendectomy    CERVICAL FUSION  05/14/2018    Cervical Vertebral Fusion    LUMBAR LAMINECTOMY  05/14/2018    Laminectomy Lumbar    OTHER SURGICAL HISTORY  05/14/2018    Hip Replacement Bilateral    OTHER SURGICAL HISTORY  02/15/2022    Tibia fracture repair    OTHER SURGICAL HISTORY  11/13/2018    Excision melanoma    OTHER SURGICAL HISTORY  11/13/2018    Ureteral stent placement     Social History     Tobacco Use    Smoking status: Every Day     Types: Cigarettes    Smokeless tobacco: Never   Substance Use Topics    Alcohol use: Not Currently     family history is not on file.    Current Outpatient Medications:     azithromycin (Zithromax) 500 mg tablet, Take 1 tablet (500 mg) by mouth once a day on Monday, Wednesday, and Friday., Disp: , Rfl:     ethambutol (Myambutol) 400 mg tablet, Take 4 tablets (1,600 mg) by mouth once daily., Disp: , Rfl:     methylPREDNISolone (Medrol) 4 mg tablet, Take 1-2 tablets (4-8 mg) by mouth once daily. (Patient not taking: Reported on 9/3/2024), Disp: 42 tablet, Rfl: 0    oxyCODONE-acetaminophen (Percocet) 7.5-325 mg tablet, Take 1 tablet by mouth every 6 hours if needed for severe pain (7 - 10)., Disp: 100  "tablet, Rfl: 0    primidone (Mysoline) 50 mg tablet, Take 1 tablet (50 mg) by mouth once daily at bedtime., Disp: 30 tablet, Rfl: 1    rifAMPin (Rifadin) 300 mg capsule, Take 1 capsule (300 mg) by mouth 2 times a day., Disp: , Rfl:   Allergies   Allergen Reactions    Gabapentin Dizziness    Codeine Unknown    Ibuprofen GI bleeding    Nsaids (Non-Steroidal Anti-Inflammatory Drug) GI bleeding       Objective     /65   Pulse 80   Temp 36.3 °C (97.3 °F)   Ht 1.753 m (5' 9\")   Wt 89.4 kg (197 lb)   BMI 29.09 kg/m²     CONSTITUTIONAL:  No acute distress    MENTAL STATUS:  Awake, alert, fully oriented to self, place, and time, with present short-term memory, good awareness of recent events, normal attention span, concentration, and fund of knowledge.    SPEECH AND LANGUAGE:  Can name and repeat, follows all commands, has no dysarthria    CRANIAL NERVES:  II-Vision present, visual fields full to confrontational testing    III/IV/VI--EOMs are present in all directions.  Pupils are symmetrically reactive in dim light.  No ptosis.    V--Normal facial sensation.    VII--No facial asymmetry.    VIII--Hearing present to voice bilaterally.    IX/X--Symmetric soft palate rise.    XI--Normal trapezius power bilaterally.    XII--Tongue protrudes without deviation.    MOTOR:  Normal power, tone, and bulk in both arms and both legs.    SENSORY:  Reduced pin sensation on the left lateral shin and dorsum of foot (L5 territory).  Otherwise normal pin sensation in both arms and both legs without distal-proximal gradient, asymmetry, or spinal sensory level.    COORDINATION:  Normal finger-to-nose and heel-to-shin testing in both arms and both legs.    REFLEXES are normal and symmetric at the biceps, triceps, brachioradialis, patella, and ankle.  The plantar responses are flexor.    GAIT is antalgic due to left leg pain, but otherwise normal, without steppage, ataxia, shuffling, or spasticity.    MR LUMBAR SPINE W AND WO IV " CONTRAST;  6/18/2024 8:54 am      INDICATION:  Signs/Symptoms:Chronic lower back pain, history of 3 back surgeries,  now numbness and tingling down L leg into L foot.      COMPARISON:  March 20, 2021.      ACCESSION NUMBER(S):  [id number]      ORDERING CLINICIAN:  RICA WINTERS      TECHNIQUE:  The lumbar spine was studied in the sagital, axial and coronal planes  utiliing T1 and T2 weighted images.   Following intravenous injection  of gadolinium contrast, T1 weighted fat suppressed multiplanar images  were also performed.      FINDINGS:  Heterogeneous marrow signal throughout the lumbar spine with  discogenic marrow signal changes prominent at L3/L4 and L4/L5  unchanged from the previous exam. Alignment is normal. Vertebral body  height is normal.. The conus and sacrum are normal. Images at each  interspace reveal the following:      T12/L1      There is normal alignment and vertebral body height. The disc space  is normal. There is no evidence of canal or foraminal narrowing.  There is no evidence of bulging or herniated disc. L1/L2  Mild asymmetrical bulging intervertebral disc to the left. Bilateral  facet hypertrophy. Mild left-sided foraminal narrowing. No measurable  canal stenosis. L2/L3  Circumferential bulging intervertebral disc. Bilateral facet  hypertrophy. No measurable canal stenosis. Moderate bilateral  foraminal narrowing. Note is made of a right renal cyst unchanged  from the previous exam without nodularity or fluid level. L3/L4  Bulging intervertebral disc and bilateral facet hypertrophy.  Flattening of the thecal sac which measures a proximally 8 mm in AP  dimension in the midline. Moderate/advanced bilateral foraminal  narrowing without focal disc herniation L4/L5  Previous left-sided laminectomy without residual fluid collection or  hemorrhage. Left worse than right facet hypertrophy. Loss of the  normal soft tissue planes within the left-sided neural foramen  unchanged from the previous  exam. Far lateral disc herniation not  excluded at this location. Finding best appreciated on parasagittal  T2 weighted image 20/29. No measurable canal stenosis. L5/S1  Bilateral facet hypertrophy and bulging intervertebral disc without  measurable central canal stenosis. Left worse than right foraminal  narrowing.      Following contrast injection 14 cc Dotarem, there is no abnormal  enhancement at the surgical site.          IMPRESSION:  * There is no measurable change compared to the previous exam.      MACRO:  none      Signed by: Kd Fernandez 6/18/2024 9:50 AM  Dictation workstation:   [id number]      Benson Medley Jr., M.D., [facility]

## 2024-09-03 NOTE — LETTER
September 3, 2024     Wicho OCHOA DO  [address] [facility] [address]    Patient: Salvador Ocampo   YOB: 1955   Date of Visit: 9/3/2024       Dear Dr. Wicho OCHOA DO:    Thank you for allowing me to continue in the neurological care of your patient, Salvador Ocampo. Below are my notes for this visit.  If you have questions, please do not hesitate to call me.       Sincerely,     Benson Medley Jr., M.D., [facility]       CC: Arie Petersen MD  ______________________________________________________________________________________    NEUROLOGY OUTPATIENT FOLLOW-UP NOTE    Assessment/Plan  Diagnoses and all orders for this visit:  Benign essential tremor  -     primidone (Mysoline) 50 mg tablet; Take 1 tablet (50 mg) by mouth once daily at bedtime.  Lumbar radiculopathy  -     nortriptyline (Pamelor) 10 mg capsule; Take 1 capsule (10 mg) by mouth once daily at bedtime.      IMPRESSION:  Stable benign essential tremor.  New symptoms and signs for left L5 radiculopathy, with neurogenic claudication.    PLAN:  I refilled primidone as it is effective for the tremor.  I advised the patient to follow up with his spine surgeon given lack of efficacy of conservative measures.  In the meantime, I started him on low-dose nortriptyline at 10 mg (he is sensitive to medication), with increase to 20 mg in two weeks if no change in his neuropathic symptoms.  I will see him again in three months or prn, but I encouraged him to call in the next 2-3 weeks with progress.      Benson Medley Jr., M.D., [facility]   ----------    Subjective    Salvador Ocampo is a 69 y.o. year old male here for follow-up of benign essential tremor.  Last visit was 01/2023.    Tremor remains stable on primidone 50 mg at bedtime.      He has a new complaint.  In the last six months, he has developed positional left buttock and calf pain.  He has a prior history of lumbar laminectomy.  About six months ago, he developed  burning dysesthesias and sharp pain in the left buttock and thigh after walking for more than five minutes.  After sitting for 5-10 minutes, the pain resolves.  He presented to his surgeon, who recommended physical therapy.  This has not been helpful and in fact he has more discomfort.  He says he was tried on a low dose of gabapentin and after two bedtime doses, he found that it caused such cognitive symptoms that he stopped it.    He denies other focal neurological symptoms including dysarthria, dysphagia, diplopia, focal weakness, other focal sensory change, ataxia, vertigo, or bowel/bladder incontinence, among others.      Past Medical History:   Diagnosis Date   • Laceration without foreign body of right thumb with damage to nail, initial encounter 06/13/2019    Laceration of right thumb without foreign body with damage to nail, initial encounter   • Personal history of malignant melanoma of skin 10/08/2020    History of malignant melanoma of skin   • Personal history of other diseases of the circulatory system     History of hypertension   • Personal history of other diseases of the musculoskeletal system and connective tissue     History of rheumatoid arthritis   • Personal history of other specified conditions 04/01/2019    History of diarrhea     Past Surgical History:   Procedure Laterality Date   • APPENDECTOMY  05/14/2018    Appendectomy   • CERVICAL FUSION  05/14/2018    Cervical Vertebral Fusion   • LUMBAR LAMINECTOMY  05/14/2018    Laminectomy Lumbar   • OTHER SURGICAL HISTORY  05/14/2018    Hip Replacement Bilateral   • OTHER SURGICAL HISTORY  02/15/2022    Tibia fracture repair   • OTHER SURGICAL HISTORY  11/13/2018    Excision melanoma   • OTHER SURGICAL HISTORY  11/13/2018    Ureteral stent placement     Social History     Tobacco Use   • Smoking status: Every Day     Types: Cigarettes   • Smokeless tobacco: Never   Substance Use Topics   • Alcohol use: Not Currently     family history is not on  "file.    Current Outpatient Medications:   •  azithromycin (Zithromax) 500 mg tablet, Take 1 tablet (500 mg) by mouth once a day on Monday, Wednesday, and Friday., Disp: , Rfl:   •  ethambutol (Myambutol) 400 mg tablet, Take 4 tablets (1,600 mg) by mouth once daily., Disp: , Rfl:   •  methylPREDNISolone (Medrol) 4 mg tablet, Take 1-2 tablets (4-8 mg) by mouth once daily. (Patient not taking: Reported on 9/3/2024), Disp: 42 tablet, Rfl: 0  •  oxyCODONE-acetaminophen (Percocet) 7.5-325 mg tablet, Take 1 tablet by mouth every 6 hours if needed for severe pain (7 - 10)., Disp: 100 tablet, Rfl: 0  •  primidone (Mysoline) 50 mg tablet, Take 1 tablet (50 mg) by mouth once daily at bedtime., Disp: 30 tablet, Rfl: 1  •  rifAMPin (Rifadin) 300 mg capsule, Take 1 capsule (300 mg) by mouth 2 times a day., Disp: , Rfl:   Allergies   Allergen Reactions   • Gabapentin Dizziness   • Codeine Unknown   • Ibuprofen GI bleeding   • Nsaids (Non-Steroidal Anti-Inflammatory Drug) GI bleeding       Objective    /65   Pulse 80   Temp 36.3 °C (97.3 °F)   Ht 1.753 m (5' 9\")   Wt 89.4 kg (197 lb)   BMI 29.09 kg/m²     CONSTITUTIONAL:  No acute distress    MENTAL STATUS:  Awake, alert, fully oriented to self, place, and time, with present short-term memory, good awareness of recent events, normal attention span, concentration, and fund of knowledge.    SPEECH AND LANGUAGE:  Can name and repeat, follows all commands, has no dysarthria    CRANIAL NERVES:  II-Vision present, visual fields full to confrontational testing    III/IV/VI--EOMs are present in all directions.  Pupils are symmetrically reactive in dim light.  No ptosis.    V--Normal facial sensation.    VII--No facial asymmetry.    VIII--Hearing present to voice bilaterally.    IX/X--Symmetric soft palate rise.    XI--Normal trapezius power bilaterally.    XII--Tongue protrudes without deviation.    MOTOR:  Normal power, tone, and bulk in both arms and both legs.    SENSORY:  " Reduced pin sensation on the left lateral shin and dorsum of foot (L5 territory).  Otherwise normal pin sensation in both arms and both legs without distal-proximal gradient, asymmetry, or spinal sensory level.    COORDINATION:  Normal finger-to-nose and heel-to-shin testing in both arms and both legs.    REFLEXES are normal and symmetric at the biceps, triceps, brachioradialis, patella, and ankle.  The plantar responses are flexor.    GAIT is antalgic due to left leg pain, but otherwise normal, without steppage, ataxia, shuffling, or spasticity.    MR LUMBAR SPINE W AND WO IV CONTRAST;  6/18/2024 8:54 am      INDICATION:  Signs/Symptoms:Chronic lower back pain, history of 3 back surgeries,  now numbness and tingling down L leg into L foot.      COMPARISON:  March 20, 2021.      ACCESSION NUMBER(S):  [id number]      ORDERING CLINICIAN:  RICA WINTERS      TECHNIQUE:  The lumbar spine was studied in the sagital, axial and coronal planes  utiliing T1 and T2 weighted images.   Following intravenous injection  of gadolinium contrast, T1 weighted fat suppressed multiplanar images  were also performed.      FINDINGS:  Heterogeneous marrow signal throughout the lumbar spine with  discogenic marrow signal changes prominent at L3/L4 and L4/L5  unchanged from the previous exam. Alignment is normal. Vertebral body  height is normal.. The conus and sacrum are normal. Images at each  interspace reveal the following:      T12/L1      There is normal alignment and vertebral body height. The disc space  is normal. There is no evidence of canal or foraminal narrowing.  There is no evidence of bulging or herniated disc. L1/L2  Mild asymmetrical bulging intervertebral disc to the left. Bilateral  facet hypertrophy. Mild left-sided foraminal narrowing. No measurable  canal stenosis. L2/L3  Circumferential bulging intervertebral disc. Bilateral facet  hypertrophy. No measurable canal stenosis. Moderate bilateral  foraminal narrowing.  Note is made of a right renal cyst unchanged  from the previous exam without nodularity or fluid level. L3/L4  Bulging intervertebral disc and bilateral facet hypertrophy.  Flattening of the thecal sac which measures a proximally 8 mm in AP  dimension in the midline. Moderate/advanced bilateral foraminal  narrowing without focal disc herniation L4/L5  Previous left-sided laminectomy without residual fluid collection or  hemorrhage. Left worse than right facet hypertrophy. Loss of the  normal soft tissue planes within the left-sided neural foramen  unchanged from the previous exam. Far lateral disc herniation not  excluded at this location. Finding best appreciated on parasagittal  T2 weighted image 20/29. No measurable canal stenosis. L5/S1  Bilateral facet hypertrophy and bulging intervertebral disc without  measurable central canal stenosis. Left worse than right foraminal  narrowing.      Following contrast injection 14 cc Dotarem, there is no abnormal  enhancement at the surgical site.          IMPRESSION:  * There is no measurable change compared to the previous exam.      MACRO:  none      Signed by: Kd Fernandez 6/18/2024 9:50 AM  Dictation workstation:   [id number]      Benson Medley Jr., M.D., [facility]

## 2024-09-30 DIAGNOSIS — M13.80 SERONEGATIVE INFLAMMATORY ARTHRITIS: ICD-10-CM

## 2024-09-30 RX ORDER — OXYCODONE AND ACETAMINOPHEN 7.5; 325 MG/1; MG/1
1 TABLET ORAL EVERY 6 HOURS PRN
Qty: 100 TABLET | Refills: 0 | Status: SHIPPED | OUTPATIENT
Start: 2024-09-30 | End: 2024-10-30

## 2024-11-07 ENCOUNTER — APPOINTMENT (OUTPATIENT)
Dept: RHEUMATOLOGY | Facility: CLINIC | Age: 69
End: 2024-11-07
Payer: MEDICARE

## 2024-11-07 VITALS
DIASTOLIC BLOOD PRESSURE: 79 MMHG | OXYGEN SATURATION: 98 % | WEIGHT: 196 LBS | BODY MASS INDEX: 29.03 KG/M2 | HEIGHT: 69 IN | SYSTOLIC BLOOD PRESSURE: 160 MMHG | HEART RATE: 72 BPM

## 2024-11-07 DIAGNOSIS — M47.817 LUMBOSACRAL SPONDYLOSIS WITHOUT MYELOPATHY: ICD-10-CM

## 2024-11-07 DIAGNOSIS — Z79.891 ENCOUNTER FOR LONG-TERM OPIATE ANALGESIC USE: ICD-10-CM

## 2024-11-07 DIAGNOSIS — M13.80 SERONEGATIVE INFLAMMATORY ARTHRITIS: Primary | ICD-10-CM

## 2024-11-07 PROCEDURE — G2211 COMPLEX E/M VISIT ADD ON: HCPCS | Performed by: INTERNAL MEDICINE

## 2024-11-07 PROCEDURE — 99213 OFFICE O/P EST LOW 20 MIN: CPT | Performed by: INTERNAL MEDICINE

## 2024-11-07 PROCEDURE — 3008F BODY MASS INDEX DOCD: CPT | Performed by: INTERNAL MEDICINE

## 2024-11-07 PROCEDURE — 1159F MED LIST DOCD IN RCRD: CPT | Performed by: INTERNAL MEDICINE

## 2024-11-07 PROCEDURE — 3077F SYST BP >= 140 MM HG: CPT | Performed by: INTERNAL MEDICINE

## 2024-11-07 PROCEDURE — 3078F DIAST BP <80 MM HG: CPT | Performed by: INTERNAL MEDICINE

## 2024-11-07 RX ORDER — OXYCODONE HYDROCHLORIDE 5 MG/1
TABLET ORAL
COMMUNITY
Start: 2024-11-05

## 2024-11-07 RX ORDER — TIZANIDINE 2 MG/1
2 TABLET ORAL EVERY 8 HOURS PRN
COMMUNITY
Start: 2024-11-04 | End: 2024-11-11

## 2024-11-07 ASSESSMENT — ENCOUNTER SYMPTOMS
SLEEP DISTURBANCE: 1
FATIGUE: 1

## 2024-11-07 NOTE — PROGRESS NOTES
Subjective   Patient ID: Salvador Ocampo is a 69 y.o. male who presents for Follow-up (3 month follow up~ Patient had back surgery 2 weeks ago).  HPI  Patient with history of seronegative rheumatoid arthritis with also history of positive QuantiFERON TB Gold status post treatment.  Had only been on 1 dose of Enbrel before being diagnosed with melanoma.  Has been on Orencia and Rituxan.  Last Rituxan infusion in late 2022.  History of MAC infection.    He is accompanied by his wife today  Patient was last seen in August and at that time He continued to be off of DMARD therapy.  I did not want him to be on steroids continuously.  We had him continue with his Percocet prescription 7.5/325.  In the interim he had spine surgery On 10/23/2024.  Had L3 laminectomy, L2 inferior hemilaminectomy, L4 superior hemilaminectomy, L2, L3, L3, L4 bilateral foraminotomies, microscopy.    He does not have the bad nerve pain going down his leg anymore but he still has lower back pain.  He is unable to bend or drive.  He denies any falls.  His body is adjusting to his surgery and he has symptoms in his right leg and thigh.  He has been given Robaxin and most recently tizanidine    He is been prescribed oxycodone 5 mg most recently on 11/5/2024 #56, 10/28/2024 #56, 10/24/2024 #40.  He has not had any skin issues and does go for regular Skin checks with his history of cancer  He stopped his antibiotic for his history of MAC infection.  He never had a proper response from his infectious disease provider but the plan was for him to stop this fall.  They are planning to go to [place] in January    His hands have been doing okay as well as his shoulders.  Review of Systems   Constitutional:  Positive for fatigue.   Respiratory:          Breathing has improved   Musculoskeletal:         Per HPI   Psychiatric/Behavioral:  Positive for sleep disturbance.        Objective   Physical Exam  GEN: NAD A&O x3 appropriate affect  No large swelling  in the DIP PIP MCPs.  Hypertrophic changes some decreased range of motion and difficulty with full extension of his fingers no swelling elbows or shoulders  Assessment/Plan     Seronegative rheumatoid arthritis with positive quantiferon TB gold has been treated. Patient with history of melanoma. He was only on Enbrel for 1 month before being diagnosed with melanoma . Has been on orenica previously. His last Rituxan was in late 2022.  History of MAC infection and was on antibiotics for 24 months and he recently discontinued    -Not on any DMARD therapy and at this time I am reluctant about restarting him again.  Now recovering from recent spine surgery    He has received oxycodone through his spine surgeons and has been taking it 3-4 times a day and is wondering if he can go to a lower dose.  We have had him at 7.5/325 and asking for in the future made go down to 5/325 that he takes it regularly 3 times a day with an occasional fourth if he needs it.  He just had a recent prescriptions.  Told him that when he runs out he cannot get received prescriptions from his other providers.    Will see him again in 3 months or as needed    OARRS:  No data recorded  I have personally reviewed the OARRS report for Salvador Ocampo. I have considered the risks of abuse, dependence, addiction and diversion    Is the patient prescribed a combination of a benzodiazepine and opioid?  Yes, I feel it is clincially indicated to continue the medication and have discussed with the patient risks/benefits/alternatives.    Last Urine Drug Screen / ordered today: 8/5/2024     Controlled Substance Agreement:  Date of the Last Agreement: 8/5/2024  Reviewed Controlled Substance Agreement including but not limited to the benefits, risks, and alternatives to treatment with a Controlled Substance medication(s).    Opioids:  What is the patient's goal of therapy?  Help with pain  Is this being achieved with current treatment?  Help with pain  I feel  that it is clinically indicated to continue this current medication regimen after consideration of alternative therapies, and other non-opioid treatment.    Opioid Risk Screening:  No data recorded    Pain Assessment:  No data recorded    Suni Chery MD 11/07/24 10:18 AM

## 2024-11-12 DIAGNOSIS — M13.80 SERONEGATIVE INFLAMMATORY ARTHRITIS: ICD-10-CM

## 2024-11-12 RX ORDER — OXYCODONE AND ACETAMINOPHEN 7.5; 325 MG/1; MG/1
1 TABLET ORAL EVERY 6 HOURS PRN
Qty: 100 TABLET | Refills: 0 | Status: CANCELLED | OUTPATIENT
Start: 2024-11-12 | End: 2024-12-12

## 2024-11-13 RX ORDER — OXYCODONE AND ACETAMINOPHEN 5; 325 MG/1; MG/1
1 TABLET ORAL EVERY 6 HOURS PRN
Qty: 110 TABLET | Refills: 0 | Status: SHIPPED | OUTPATIENT
Start: 2024-11-13 | End: 2024-12-13

## 2024-12-02 ENCOUNTER — APPOINTMENT (OUTPATIENT)
Dept: NEUROLOGY | Facility: CLINIC | Age: 69
End: 2024-12-02
Payer: MEDICARE

## 2024-12-02 VITALS
TEMPERATURE: 97.7 F | SYSTOLIC BLOOD PRESSURE: 144 MMHG | DIASTOLIC BLOOD PRESSURE: 66 MMHG | WEIGHT: 203 LBS | BODY MASS INDEX: 30.07 KG/M2 | HEIGHT: 69 IN | HEART RATE: 69 BPM

## 2024-12-02 DIAGNOSIS — M54.16 LUMBAR RADICULOPATHY: ICD-10-CM

## 2024-12-02 DIAGNOSIS — G25.0 BENIGN ESSENTIAL TREMOR: Primary | ICD-10-CM

## 2024-12-02 PROCEDURE — 3008F BODY MASS INDEX DOCD: CPT | Performed by: PSYCHIATRY & NEUROLOGY

## 2024-12-02 PROCEDURE — 1159F MED LIST DOCD IN RCRD: CPT | Performed by: PSYCHIATRY & NEUROLOGY

## 2024-12-02 PROCEDURE — 1160F RVW MEDS BY RX/DR IN RCRD: CPT | Performed by: PSYCHIATRY & NEUROLOGY

## 2024-12-02 PROCEDURE — 3078F DIAST BP <80 MM HG: CPT | Performed by: PSYCHIATRY & NEUROLOGY

## 2024-12-02 PROCEDURE — 3077F SYST BP >= 140 MM HG: CPT | Performed by: PSYCHIATRY & NEUROLOGY

## 2024-12-02 PROCEDURE — 99213 OFFICE O/P EST LOW 20 MIN: CPT | Performed by: PSYCHIATRY & NEUROLOGY

## 2024-12-02 NOTE — PROGRESS NOTES
NEUROLOGY OUTPATIENT FOLLOW-UP NOTE    Assessment/Plan   Diagnoses and all orders for this visit:  Benign essential tremor  Lumbar radiculopathy      IMPRESSION:  Stable essential tremor.  Improving lumbar radiculopathy referable to spondylosis.    PLAN:  To continue primidone for the tremor.  If his neuropathic pain worsens, I advised him to try nortriptyline again, for longer.  I will otherwise see him in six months or prn.      Benson Medley Jr., M.D., FAAN   ----------    Subjective     Salvador Ocampo is a 69 y.o. year old male here for follow-up.    Tremor is stably controlled by primidone.    He only stayed on nortriptyline for about a week (ie, not long enough) and when it didn't work quickly, he stopped it.    He had lumbar surgery in October and it reduced the pain by half, and he has partial return of sensation in the left foot.  However, the pain is still enough to `be bothersome to him.    He denies other focal neurological symptoms including dysarthria, dysphagia, diplopia, focal weakness, other focal sensory change, ataxia, vertigo, or bowel/bladder incontinence, among others.      Past Medical History:   Diagnosis Date    Laceration without foreign body of right thumb with damage to nail, initial encounter 06/13/2019    Laceration of right thumb without foreign body with damage to nail, initial encounter    Personal history of malignant melanoma of skin 10/08/2020    History of malignant melanoma of skin    Personal history of other diseases of the circulatory system     History of hypertension    Personal history of other diseases of the musculoskeletal system and connective tissue     History of rheumatoid arthritis    Personal history of other specified conditions 04/01/2019    History of diarrhea     Past Surgical History:   Procedure Laterality Date    APPENDECTOMY  05/14/2018    Appendectomy    CERVICAL FUSION  05/14/2018    Cervical Vertebral Fusion    LUMBAR LAMINECTOMY  05/14/2018  "   Laminectomy Lumbar    OTHER SURGICAL HISTORY  05/14/2018    Hip Replacement Bilateral    OTHER SURGICAL HISTORY  02/15/2022    Tibia fracture repair    OTHER SURGICAL HISTORY  11/13/2018    Excision melanoma    OTHER SURGICAL HISTORY  11/13/2018    Ureteral stent placement     Social History     Tobacco Use    Smoking status: Every Day     Types: Cigarettes    Smokeless tobacco: Never   Substance Use Topics    Alcohol use: Not Currently     family history is not on file.    Current Outpatient Medications:     cetirizine (ZyrTEC) 10 mg capsule, Take by mouth once daily., Disp: , Rfl:     oxyCODONE (Roxicodone) 5 mg immediate release tablet, TAKE 1 OR 2 TABLETS BY MOUTH / feeding tube EVERY 6 HOURS AS NEEDED FOR PAIN (moderate TO SEVERE postop pain) FOR UP TO 7 DAYS, Disp: , Rfl:     oxyCODONE-acetaminophen (Percocet) 5-325 mg tablet, Take 1 tablet by mouth every 6 hours if needed for moderate pain (4 - 6)., Disp: 110 tablet, Rfl: 0    oxyCODONE-acetaminophen (Percocet) 7.5-325 mg tablet, Take 1 tablet by mouth every 6 hours if needed for severe pain (7 - 10)., Disp: 100 tablet, Rfl: 0    primidone (Mysoline) 50 mg tablet, Take 1 tablet (50 mg) by mouth once daily at bedtime., Disp: 90 tablet, Rfl: 3    tiZANidine (Zanaflex) 2 mg tablet, Take 1 tablet (2 mg) by mouth every 8 hours if needed. (Patient not taking: Reported on 12/2/2024), Disp: , Rfl:   Allergies   Allergen Reactions    Gabapentin Dizziness    Codeine Unknown    Ibuprofen GI bleeding    Nsaids (Non-Steroidal Anti-Inflammatory Drug) GI bleeding       Objective     /66   Pulse 69   Temp 36.5 °C (97.7 °F)   Ht 1.753 m (5' 9\")   Wt 92.1 kg (203 lb)   BMI 29.98 kg/m²     CONSTITUTIONAL:  No acute distress    MENTAL STATUS:  Awake, alert, fully oriented to self, place, and time, with present short-term memory, good awareness of recent events, normal attention span, concentration, and fund of knowledge.    SPEECH AND LANGUAGE:  Can name and " repeat, follows all commands, has no dysarthria    CRANIAL NERVES:  II-Vision present, visual fields full to confrontational testing    III/IV/VI--EOMs are present in all directions.  Pupils are symmetrically reactive in dim light.  No ptosis.    V--Normal facial sensation.    VII--No facial asymmetry.    VIII--Hearing present to voice bilaterally.    IX/X--Symmetric soft palate rise.    XI--Normal trapezius power bilaterally.    XII--Tongue protrudes without deviation.    MOTOR:  Normal power, tone, and bulk in both arms and both legs.  No significant tremor today.    SENSORY:  Reduced pin sensation on the left lateral shin (improved, foot was involved as well before surgery).  Otherwise normal pin sensation in both arms and both legs without distal-proximal gradient, asymmetry, or spinal sensory level.     COORDINATION:  Normal finger-to-nose and heel-to-shin testing in both arms and both legs.    REFLEXES are normal and symmetric at the biceps, triceps, brachioradialis, patella, and ankle.  The plantar responses are flexor.    GAIT is normal, without steppage, ataxia, shuffling, or spasticity.      Benson Medley Jr., M.D., FAAN

## 2024-12-10 DIAGNOSIS — M13.80 SERONEGATIVE INFLAMMATORY ARTHRITIS: ICD-10-CM

## 2024-12-10 RX ORDER — OXYCODONE AND ACETAMINOPHEN 5; 325 MG/1; MG/1
1 TABLET ORAL EVERY 6 HOURS PRN
Qty: 110 TABLET | Refills: 0 | Status: SHIPPED | OUTPATIENT
Start: 2024-12-10 | End: 2025-01-09

## 2025-01-08 ENCOUNTER — TELEPHONE (OUTPATIENT)
Dept: PRIMARY CARE | Facility: CLINIC | Age: 70
End: 2025-01-08
Payer: MEDICARE

## 2025-01-08 DIAGNOSIS — M13.80 SERONEGATIVE INFLAMMATORY ARTHRITIS: ICD-10-CM

## 2025-01-08 RX ORDER — OXYCODONE AND ACETAMINOPHEN 5; 325 MG/1; MG/1
1 TABLET ORAL EVERY 6 HOURS PRN
Qty: 110 TABLET | Refills: 0 | Status: SHIPPED | OUTPATIENT
Start: 2025-01-08 | End: 2025-02-07

## 2025-01-08 NOTE — TELEPHONE ENCOUNTER
Has chest cold since cristine- wife just saw angela for same thing, asked for appt- can be with PA

## 2025-01-09 ENCOUNTER — OFFICE VISIT (OUTPATIENT)
Dept: PRIMARY CARE | Facility: CLINIC | Age: 70
End: 2025-01-09
Payer: MEDICARE

## 2025-01-09 VITALS
SYSTOLIC BLOOD PRESSURE: 118 MMHG | WEIGHT: 199 LBS | BODY MASS INDEX: 29.39 KG/M2 | TEMPERATURE: 98.2 F | DIASTOLIC BLOOD PRESSURE: 68 MMHG | HEART RATE: 82 BPM | OXYGEN SATURATION: 98 %

## 2025-01-09 DIAGNOSIS — J01.90 ACUTE SINUSITIS, RECURRENCE NOT SPECIFIED, UNSPECIFIED LOCATION: Primary | ICD-10-CM

## 2025-01-09 DIAGNOSIS — J20.9 ACUTE BRONCHITIS, UNSPECIFIED ORGANISM: ICD-10-CM

## 2025-01-09 PROBLEM — S82.143A FRACTURE OF TIBIAL PLATEAU: Status: RESOLVED | Noted: 2025-01-09 | Resolved: 2025-01-09

## 2025-01-09 PROBLEM — G56.20 ULNAR NEUROPATHY: Status: ACTIVE | Noted: 2025-01-09

## 2025-01-09 PROBLEM — U07.1 ACUTE DISEASE DUE TO SEVERE ACUTE RESPIRATORY SYNDROME CORONAVIRUS 2 (SARS-COV-2): Status: RESOLVED | Noted: 2025-01-09 | Resolved: 2025-01-09

## 2025-01-09 PROBLEM — M48.061 SPINAL STENOSIS OF LUMBAR REGION: Status: ACTIVE | Noted: 2024-07-24

## 2025-01-09 PROBLEM — R19.7 DIARRHEA: Status: RESOLVED | Noted: 2025-01-09 | Resolved: 2025-01-09

## 2025-01-09 PROBLEM — M25.569 KNEE PAIN: Status: RESOLVED | Noted: 2025-01-09 | Resolved: 2025-01-09

## 2025-01-09 PROBLEM — M62.81 MUSCLE WEAKNESS OF EXTREMITY: Status: ACTIVE | Noted: 2025-01-09

## 2025-01-09 PROBLEM — Z98.890 S/P LUMBAR LAMINECTOMY: Status: ACTIVE | Noted: 2024-10-24

## 2025-01-09 PROBLEM — F41.9 ANXIETY: Status: ACTIVE | Noted: 2025-01-09

## 2025-01-09 PROBLEM — F11.90 NARCOTIC DRUG USE: Status: ACTIVE | Noted: 2025-01-09

## 2025-01-09 PROBLEM — C43.60 MALIGNANT MELANOMA OF UPPER EXTREMITY (MULTI): Status: ACTIVE | Noted: 2025-01-09

## 2025-01-09 PROBLEM — S62.523A FRACTURE OF DISTAL PHALANX OF THUMB: Status: RESOLVED | Noted: 2025-01-09 | Resolved: 2025-01-09

## 2025-01-09 PROBLEM — R41.3 MEMORY IMPAIRMENT: Status: ACTIVE | Noted: 2025-01-09

## 2025-01-09 PROBLEM — J84.9 INTERSTITIAL PULMONARY DISEASE (MULTI): Status: ACTIVE | Noted: 2024-10-15

## 2025-01-09 PROBLEM — D72.829 LEUKOCYTOSIS: Status: RESOLVED | Noted: 2025-01-09 | Resolved: 2025-01-09

## 2025-01-09 PROBLEM — M54.16 LUMBAR RADICULOPATHY: Status: ACTIVE | Noted: 2025-01-09

## 2025-01-09 PROBLEM — N52.9 ERECTILE DYSFUNCTION: Status: ACTIVE | Noted: 2025-01-09

## 2025-01-09 PROCEDURE — 3074F SYST BP LT 130 MM HG: CPT | Performed by: FAMILY MEDICINE

## 2025-01-09 PROCEDURE — 99213 OFFICE O/P EST LOW 20 MIN: CPT | Performed by: FAMILY MEDICINE

## 2025-01-09 PROCEDURE — 1160F RVW MEDS BY RX/DR IN RCRD: CPT | Performed by: FAMILY MEDICINE

## 2025-01-09 PROCEDURE — 3078F DIAST BP <80 MM HG: CPT | Performed by: FAMILY MEDICINE

## 2025-01-09 PROCEDURE — 1159F MED LIST DOCD IN RCRD: CPT | Performed by: FAMILY MEDICINE

## 2025-01-09 RX ORDER — DOXYCYCLINE 100 MG/1
100 CAPSULE ORAL 2 TIMES DAILY
Qty: 20 CAPSULE | Refills: 0 | Status: SHIPPED | OUTPATIENT
Start: 2025-01-09 | End: 2025-01-19

## 2025-01-09 RX ORDER — PREDNISONE 10 MG/1
TABLET ORAL
Qty: 30 TABLET | Refills: 0 | Status: SHIPPED | OUTPATIENT
Start: 2025-01-09 | End: 2025-01-21

## 2025-01-09 RX ORDER — ALBUTEROL SULFATE 90 UG/1
2 INHALANT RESPIRATORY (INHALATION) EVERY 4 HOURS PRN
Qty: 8 G | Refills: 5 | Status: SHIPPED | OUTPATIENT
Start: 2025-01-09 | End: 2026-01-09

## 2025-01-09 ASSESSMENT — ENCOUNTER SYMPTOMS: COUGH: 1

## 2025-01-09 NOTE — PROGRESS NOTES
Subjective   Patient ID: Salvador Ocampo is a 69 y.o. male who presents for Cough (Chest cold) and Sinusitis (For two weeks he has had chest and head congestion.  2-3 days ago he started with some chest/lungs hurting.).    Cough    Sinusitis  Associated symptoms include coughing.        He and wife have been sick several weeks  Taking lots of Dayquil and Nyquil     Tuesday AM - chest pain started     Lots of sinus congestion and drainage     Started Mucinex     Hx of MAC   Hx of RA - not currently on immunosuppressive     Review of Systems   Respiratory:  Positive for cough.        Objective   /68 (BP Location: Right arm, Patient Position: Sitting, BP Cuff Size: Large adult)   Pulse 82   Temp 36.8 °C (98.2 °F) (Oral)   Wt 90.3 kg (199 lb)   SpO2 98%   BMI 29.39 kg/m²     Physical Exam  Vitals reviewed.   Constitutional:       General: He is not in acute distress.     Appearance: Normal appearance.   HENT:      Head: Normocephalic and atraumatic.      Nose: Congestion present.      Mouth/Throat:      Mouth: Mucous membranes are moist.      Pharynx: No posterior oropharyngeal erythema.   Eyes:      Extraocular Movements: Extraocular movements intact.      Conjunctiva/sclera: Conjunctivae normal.      Pupils: Pupils are equal, round, and reactive to light.   Cardiovascular:      Rate and Rhythm: Normal rate and regular rhythm.      Heart sounds: Normal heart sounds. No murmur heard.  Pulmonary:      Effort: Pulmonary effort is normal. No respiratory distress.      Breath sounds: No wheezing.      Comments: Prolonged Exp at bases   Musculoskeletal:      Cervical back: No rigidity.   Lymphadenopathy:      Cervical: No cervical adenopathy.   Skin:     General: Skin is warm and dry.      Findings: No rash.   Neurological:      General: No focal deficit present.      Mental Status: He is alert. Mental status is at baseline.   Psychiatric:         Mood and Affect: Mood normal.         Thought Content: Thought  content normal.         Assessment/Plan   Problem List Items Addressed This Visit    None  Visit Diagnoses         Codes    Acute sinusitis, recurrence not specified, unspecified location    -  Primary J01.90    Relevant Medications    doxycycline (Vibramycin) 100 mg capsule    Acute bronchitis, unspecified organism     J20.9    Relevant Medications    predniSONE (Deltasone) 10 mg tablet    albuterol (Ventolin HFA) 90 mcg/actuation inhaler        Hx of MAC -   Lung fibrosis     Will place on doxy and try steroid taper too     We discussed at visit any disease processes that were of concern as well as the risks, benefits and instructions of any new medication provided.    See orders and discussion section for information provided to patient in their After Visit Summary.   Patient (and/or caretaker of patient if present)  stated all questions were answered, and they voiced understanding of instructions.

## 2025-01-09 NOTE — PATIENT INSTRUCTIONS
Its very important to eat something before you take the steroids       TREATMENT FOR SINUSITIS AND UPPER RESPIRATORY INFECTIONS:     Drink plenty of fluids, especially water.     Used humidifiers, steam, hot liquids to moisten your nasal passages.      Saline nasal spray often helps,  Simply Saline is a nice over the counter saline spray that you can use as much as you want.     IF DIRECTED TO DO SO:    You can use Afrin nasal spray for the first 3 days  ONLY , after that, it will make you worse, not better. DO NOT USE IN CHILDREN UNDER 6 YEARS OF AGE OR IF YOU HAVE ANY HYPERTENTION OR HEART ISSUES.      Mucinex or guaifenesin is an over the counter medication that often helps loosen the mucous.  DO NOT USE IN CHILDREN UNDER 6 YEARS OF AGE.      Please be sure to call or follow-up if you are not better in 5-10 days, or if you are worsening.      The most common cause of upper respiratory infections are viruses - which no not need an antibiotic to get better.   We want your own immune system to fight the virus so you or your child develop immunity to it.    However,  people can develop pneumonia, sinus infections and sometimes ear infections from a virus  - which may need an antibiotic.   So if you are showing signs of breathing issues,  or severe ear pain or facial pain with fevers, of if you are no better after 10 days , its important that you contact us.        If you are prescribed an antibiotic,  it's a good idea to take a probiotic to help prevent diarrhea and yeast infections.  This would be eating a yogurt daily or taking something like acidophillus or Culturelle.      EDUCATION ABOUT TAKING ANTIBIOTICS:     It is very important to complete the entire course of antibiotics as directed.  This helps prevent antibiotic resistant forms of bacteria.     You may want to create a chart, and damion off the doses taken to remember them all.     Common side effects of antibiotics include yeast infections, diarrhea and  nausea. Sometimes over-the-counter probiotics (such as eating yogurt or taking acidophilus or Culturelle)  may help prevent the diarrhea and yeast infections caused by antibiotics. If you develop persistent or bloody diarrhea after taking an antibiotic, please contact your provider about the possibility of a serious secondary infection of your colon caused by the antibiotic. Sometimes the nausea from antibiotics can be helped by taking the antibiotic with food unless otherwise specified not to by the pharmacist.     If you develop a rash while on the antibiotic, if could be from the antibiotic, from the illness itself, or could be from a response by some viral infections to the antibiotic. Please discuss this with your provider before assuming that you are allergic to the medication.    If it is determined that you have had an allergic reaction to the antibiotic, please make sure you make note of that for yourself to be sure to never get that antibiotic again as a more serious reaction - called anaphylaxis - may occur.   You should also ask about similar antibiotics that may be dangerous as well.    If you are a woman on birth control, it is important you use a back up form of contraception for the next month to prevent pregnancy as some antibiotics reduce the effectiveness of birth control. This could result in an unplanned pregnancy.

## 2025-02-27 ENCOUNTER — TELEPHONE (OUTPATIENT)
Dept: RHEUMATOLOGY | Facility: CLINIC | Age: 70
End: 2025-02-27
Payer: MEDICARE

## 2025-02-27 DIAGNOSIS — M13.80 SERONEGATIVE INFLAMMATORY ARTHRITIS: ICD-10-CM

## 2025-02-27 RX ORDER — OXYCODONE AND ACETAMINOPHEN 5; 325 MG/1; MG/1
1 TABLET ORAL EVERY 6 HOURS PRN
Qty: 110 TABLET | Refills: 0 | Status: SHIPPED | OUTPATIENT
Start: 2025-02-27 | End: 2025-03-29

## 2025-02-28 ENCOUNTER — TELEPHONE (OUTPATIENT)
Dept: RHEUMATOLOGY | Facility: CLINIC | Age: 70
End: 2025-02-28
Payer: MEDICARE

## 2025-02-28 DIAGNOSIS — M13.80 SERONEGATIVE INFLAMMATORY ARTHRITIS: ICD-10-CM

## 2025-02-28 RX ORDER — OXYCODONE AND ACETAMINOPHEN 5; 325 MG/1; MG/1
1 TABLET ORAL EVERY 6 HOURS PRN
Qty: 110 TABLET | Refills: 0 | OUTPATIENT
Start: 2025-02-28 | End: 2025-03-30

## 2025-02-28 NOTE — TELEPHONE ENCOUNTER
Patient called stated that he will be arriving in [place] in [place]) and needs his pain medication sent to the pharmacy in [place] verified.

## 2025-03-24 ENCOUNTER — APPOINTMENT (OUTPATIENT)
Dept: RHEUMATOLOGY | Facility: CLINIC | Age: 70
End: 2025-03-24
Payer: MEDICARE

## 2025-03-24 VITALS
OXYGEN SATURATION: 96 % | HEIGHT: 71 IN | DIASTOLIC BLOOD PRESSURE: 78 MMHG | SYSTOLIC BLOOD PRESSURE: 144 MMHG | HEART RATE: 70 BPM | BODY MASS INDEX: 27.72 KG/M2 | WEIGHT: 198 LBS

## 2025-03-24 DIAGNOSIS — M47.817 LUMBOSACRAL SPONDYLOSIS WITHOUT MYELOPATHY: ICD-10-CM

## 2025-03-24 DIAGNOSIS — M13.80 SERONEGATIVE INFLAMMATORY ARTHRITIS: Primary | ICD-10-CM

## 2025-03-24 DIAGNOSIS — Z79.891 ENCOUNTER FOR LONG-TERM OPIATE ANALGESIC USE: ICD-10-CM

## 2025-03-24 PROCEDURE — 1125F AMNT PAIN NOTED PAIN PRSNT: CPT | Performed by: INTERNAL MEDICINE

## 2025-03-24 PROCEDURE — 99213 OFFICE O/P EST LOW 20 MIN: CPT | Performed by: INTERNAL MEDICINE

## 2025-03-24 PROCEDURE — 3078F DIAST BP <80 MM HG: CPT | Performed by: INTERNAL MEDICINE

## 2025-03-24 PROCEDURE — G2211 COMPLEX E/M VISIT ADD ON: HCPCS | Performed by: INTERNAL MEDICINE

## 2025-03-24 PROCEDURE — 3077F SYST BP >= 140 MM HG: CPT | Performed by: INTERNAL MEDICINE

## 2025-03-24 PROCEDURE — 1159F MED LIST DOCD IN RCRD: CPT | Performed by: INTERNAL MEDICINE

## 2025-03-24 PROCEDURE — 3008F BODY MASS INDEX DOCD: CPT | Performed by: INTERNAL MEDICINE

## 2025-03-24 ASSESSMENT — PAIN SCALES - GENERAL: PAINLEVEL_OUTOF10: 6

## 2025-03-24 ASSESSMENT — ENCOUNTER SYMPTOMS
SLEEP DISTURBANCE: 1
OCCASIONAL FEELINGS OF UNSTEADINESS: 0
FATIGUE: 1
LOSS OF SENSATION IN FEET: 0
DEPRESSION: 0

## 2025-03-24 ASSESSMENT — PATIENT HEALTH QUESTIONNAIRE - PHQ9
SUM OF ALL RESPONSES TO PHQ9 QUESTIONS 1 AND 2: 0
2. FEELING DOWN, DEPRESSED OR HOPELESS: NOT AT ALL
1. LITTLE INTEREST OR PLEASURE IN DOING THINGS: NOT AT ALL

## 2025-03-24 NOTE — PROGRESS NOTES
Subjective   Patient ID: Salvador Ocampo is a 69 y.o. male who presents for Follow-up.  HPI  Patient with history of seronegative rheumatoid arthritis with also history of positive QuantiFERON TB Gold status post treatment.  Had only been on 1 dose of Enbrel before being diagnosed with melanoma.  Has been on Orencia and Rituxan.  Last Rituxan infusion in late 2022.  History of MAC infection.      Patient was last seen in November.  He was off antibiotics and also recovering from spine surgery.  He still does have some back pain but it is a lot more tolerable.  He does not have the nerve pain in also has feeling in his left lower extremity.  He still has neck and shoulder discomfort and has had previous fusion in his cervical spine.  Sometimes his hands will get achy.  He has difficulty getting his ring off sometimes.    He has been doing yard work and has been draining his apple trees.  Denies any illnesses or infections.  His breathing has been doing okay.  He still has a lot of fatigue.  In the past he has been told that he has apnea.  He was in [place] and then traveled through [place] and visited friends in [place].  While in [place] he swam in the ocean a couple times and walk the beach and walked to go shopping.  Review of Systems   Constitutional:  Positive for fatigue.   Respiratory:          Breathing has improved   Musculoskeletal:         Per HPI   Psychiatric/Behavioral:  Positive for sleep disturbance.        Objective   Physical Exam  GEN: NAD A&O x3 appropriate affect  No large swelling in the DIP PIP MCPs.  Hypertrophic changes some decreased range of motion and difficulty with full extension of his fingers no swelling elbows or shoulders  Assessment/Plan     Seronegative rheumatoid arthritis with positive quantiferon TB gold has been treated. Patient with history of melanoma. He was only on Enbrel for 1 month before being diagnosed with melanoma . Has been on orenica previously. His last  Rituxan was in late 2022.  History of MAC infection and was on antibiotics for 24 months and he recently discontinued    -Not on any DMARD therapy and at this time I am reluctant about restarting him again.      Currently given pain medication and previously had been on oxycodone/acetaminophen 7.5/325 and now on 5/325.  Checked [place].  Up-to-date on his urine drug screen and controlled substance contract.    Will see him again in 3 months or as needed    OARRS:  No data recorded  I have personally reviewed the OARRS report for Salvador Ocampo. I have considered the risks of abuse, dependence, addiction and diversion    Is the patient prescribed a combination of a benzodiazepine and opioid?  Yes, I feel it is clincially indicated to continue the medication and have discussed with the patient risks/benefits/alternatives.    Last Urine Drug Screen / ordered today: 8/5/2024     Controlled Substance Agreement:  Date of the Last Agreement: 8/5/2024  Reviewed Controlled Substance Agreement including but not limited to the benefits, risks, and alternatives to treatment with a Controlled Substance medication(s).    Opioids:  What is the patient's goal of therapy?  Help with pain  Is this being achieved with current treatment?  Help with pain  I feel that it is clinically indicated to continue this current medication regimen after consideration of alternative therapies, and other non-opioid treatment.    Opioid Risk Screening:  No data recorded    Pain Assessment:  No data recorded    Suni Chery MD 03/24/25 11:14 AM

## 2025-03-31 ENCOUNTER — TELEPHONE (OUTPATIENT)
Dept: RHEUMATOLOGY | Facility: CLINIC | Age: 70
End: 2025-03-31
Payer: MEDICARE

## 2025-03-31 DIAGNOSIS — M13.80 SERONEGATIVE INFLAMMATORY ARTHRITIS: ICD-10-CM

## 2025-03-31 RX ORDER — OXYCODONE AND ACETAMINOPHEN 5; 325 MG/1; MG/1
1 TABLET ORAL EVERY 6 HOURS PRN
Qty: 110 TABLET | Refills: 0 | Status: SHIPPED | OUTPATIENT
Start: 2025-03-31 | End: 2025-04-30

## 2025-04-04 ENCOUNTER — TELEPHONE (OUTPATIENT)
Dept: PRIMARY CARE | Facility: CLINIC | Age: 70
End: 2025-04-04
Payer: MEDICARE

## 2025-04-04 NOTE — TELEPHONE ENCOUNTER
Needs appt made for a yearly physical has not had one for over a year, needs to be with PA or new Doc please, once made we can send this to who he is seeing so they can send in refills thanks     Has an appt with Dr. Sellers.  Needs a refill on sildiafil called to [facility] in [place].  [phone]

## 2025-04-07 DIAGNOSIS — N52.9 ERECTILE DYSFUNCTION, UNSPECIFIED ERECTILE DYSFUNCTION TYPE: Primary | ICD-10-CM

## 2025-04-07 RX ORDER — SILDENAFIL 100 MG/1
50-100 TABLET, FILM COATED ORAL DAILY PRN
Qty: 30 TABLET | Refills: 1 | Status: SHIPPED | OUTPATIENT
Start: 2025-04-07 | End: 2025-06-06

## 2025-04-28 ENCOUNTER — TELEPHONE (OUTPATIENT)
Dept: RHEUMATOLOGY | Facility: CLINIC | Age: 70
End: 2025-04-28
Payer: MEDICARE

## 2025-04-28 DIAGNOSIS — M13.80 SERONEGATIVE INFLAMMATORY ARTHRITIS: ICD-10-CM

## 2025-04-28 RX ORDER — OXYCODONE AND ACETAMINOPHEN 5; 325 MG/1; MG/1
1 TABLET ORAL EVERY 6 HOURS PRN
Qty: 110 TABLET | Refills: 0 | Status: SHIPPED | OUTPATIENT
Start: 2025-04-28 | End: 2025-05-28

## 2025-04-28 NOTE — TELEPHONE ENCOUNTER
Patient called stated he needs a refill on his pain medication   LOV: 03/24/2025 NOV: 06/23/2025   Last written 03/31/2025

## 2025-05-07 ENCOUNTER — APPOINTMENT (OUTPATIENT)
Dept: PRIMARY CARE | Facility: CLINIC | Age: 70
End: 2025-05-07
Payer: MEDICARE

## 2025-05-07 VITALS
WEIGHT: 194 LBS | HEART RATE: 72 BPM | SYSTOLIC BLOOD PRESSURE: 120 MMHG | OXYGEN SATURATION: 97 % | DIASTOLIC BLOOD PRESSURE: 64 MMHG | BODY MASS INDEX: 27.16 KG/M2 | HEIGHT: 71 IN

## 2025-05-07 DIAGNOSIS — Z00.00 ROUTINE GENERAL MEDICAL EXAMINATION AT HEALTH CARE FACILITY: Primary | ICD-10-CM

## 2025-05-07 DIAGNOSIS — M06.041 RHEUMATOID ARTHRITIS INVOLVING BOTH HANDS WITH NEGATIVE RHEUMATOID FACTOR (MULTI): ICD-10-CM

## 2025-05-07 DIAGNOSIS — J43.8 OTHER EMPHYSEMA (MULTI): ICD-10-CM

## 2025-05-07 DIAGNOSIS — M06.042 RHEUMATOID ARTHRITIS INVOLVING BOTH HANDS WITH NEGATIVE RHEUMATOID FACTOR (MULTI): ICD-10-CM

## 2025-05-07 DIAGNOSIS — G47.9 SLEEP DISTURBANCE: ICD-10-CM

## 2025-05-07 DIAGNOSIS — Z12.5 SCREENING FOR PROSTATE CANCER: ICD-10-CM

## 2025-05-07 DIAGNOSIS — Z85.820 PERSONAL HISTORY OF MALIGNANT MELANOMA OF SKIN: ICD-10-CM

## 2025-05-07 DIAGNOSIS — I48.0 PAROXYSMAL ATRIAL FIBRILLATION (MULTI): ICD-10-CM

## 2025-05-07 DIAGNOSIS — J84.10 LUNG FIBROSIS (MULTI): ICD-10-CM

## 2025-05-07 DIAGNOSIS — F17.210 TOBACCO DEPENDENCE DUE TO CIGARETTES: ICD-10-CM

## 2025-05-07 DIAGNOSIS — I25.10 CORONARY ARTERIOSCLEROSIS: ICD-10-CM

## 2025-05-07 RX ORDER — TRAZODONE HYDROCHLORIDE 50 MG/1
50 TABLET ORAL NIGHTLY PRN
Qty: 30 TABLET | Refills: 0 | Status: SHIPPED | OUTPATIENT
Start: 2025-05-07 | End: 2026-05-07

## 2025-05-07 ASSESSMENT — ACTIVITIES OF DAILY LIVING (ADL)
GROCERY_SHOPPING: INDEPENDENT
MANAGING_FINANCES: INDEPENDENT
DRESSING: INDEPENDENT
TAKING_MEDICATION: INDEPENDENT
DOING_HOUSEWORK: INDEPENDENT
BATHING: INDEPENDENT

## 2025-05-07 NOTE — PROGRESS NOTES
"Subjective   Reason for Visit: Salvador Ocampo is an 69 y.o. male here for a Medicare Wellness visit and establishing with me     Past Medical, Surgical, and Family History reviewed and updated in chart.    Reviewed all medications by prescribing practitioner or clinical pharmacist (such as prescriptions, OTCs, herbal therapies and supplements) and documented in the medical record.    HPI    Medicare wellness and establishing with me today     Wife is Nicole - I care for       Updates and Concerns:     Medicare wellness and establishing with me       Chronic issues Reviewed today :     CV issues:   Sees DR Mosqueda   CAD -  \"moderate plaque formation on Coronaries\"   AAA- stable at 4 cm   Parox Afib  - after a tibial plateau fx -      Was likely only one short episode  -      Taken off anticoagulation at [facility]       Pulmonary issues:     Current smoker -  1 ppd   50 pack year history   Has tried to quit numerous times     1/1/2023- was on [place] - got sick  -   Fevers non stop   Was seeing ID - DR Phelps at [facility]  - Dx MAC    -  3 ABX for 18 mos - ended Oct 2024   [employer] years ago    Tests Pos for TB since age 19 - s/p treatment    Last CT 3/2024 -   Emphysema       Pain issues -   Seronegative RA  Hurt playing [employer] football -      -  was severely hurt - in hosp 6 weeks   Lumbar stenosis and spondylosis   S/p 3 Lumbar fusions in life   And one Cervical fusion   S/p Laminectomy at [facility] - 10/2024   Hx of bilateral hip replacements - 4 years apart    - DR Alvarado did second one     Sees DR Chery - Rheumatology   Hx of immunosuppressive therapy   Most recent note from her  - not on DMARD -   Reluctant to restart     Meds:   Oxycodone/APAP - 5/325  - TID - QID   Tizanadine 2 mg  - does not help       Years ago was on diazepam  - it helped him sleep and move better       Has a very challenging first few hours of the day       Benign Essential Tremor   Sees Neurology - DR Medley     Meds :   Primidone  50 mg hs       ED " "-   Sildenafil  100 mg prn     Hx of Melanoma 2019   - sees DERM   Every 6 mos         WAC:     Medicare wellness appt today   5/7/25    CV conditions and risks:   see above     Last cholesterol level:  a few years ago     Not on statin - he cannot recall if he tried     Last blood sugar level :    10/2024     BMI/weight :    194 lbs; BMI  27     nutrition :      exercise :      smoking status:    see above           how long and how much?  :     Need Screening Lung CT?:     YES     Need coronary calcium score?  :        Last colonoscopy or colon cancer screen :     A few years ago   FAMILY HX OF COLON CA?  :        Prostate symptoms:    PSA:  1.27  Brothers with prostate cancer     Hep C screen?  :   HIV screen?  :       vaccines -   FLU:     UTD                      PNEUMONIA:  had 13 and 23                      COVID-19: did get primary and 2 boosters                      ZOSTER:   Shingrix x 2 done                      TETNAS/PERTUSSIS: 2018                      HEP B                     RSV:  not yet                                           OTHER:      vision -    last appt:    last week     dentist -    last appt:    3/2025     alcohol consumption:  little       LIVING WILL/MEDICAL POA :   done             On chart?  :    NO        Bought original  [place] [employer]  Second career -   Use to be a  years ago   Then started the [employer] company     [facility]   - [employer] in [place]   1982 - 2001        Patient Care Team:  Davida Rodriguez MD as PCP - General (Family Medicine)     Review of Systems    Objective   Vitals:  /64 (BP Location: Right arm, Patient Position: Sitting, BP Cuff Size: Large adult)   Pulse 72   Ht 1.803 m (5' 11\")   Wt 88 kg (194 lb)   SpO2 97%   BMI 27.06 kg/m²       Physical Exam  Vitals reviewed.   Constitutional:       General: He is not in acute distress.     Appearance: Normal appearance. He is well-developed. He is not ill-appearing, toxic-appearing or " diaphoretic.   HENT:      Head: Normocephalic and atraumatic.      Right Ear: Tympanic membrane, ear canal and external ear normal. There is no impacted cerumen.      Left Ear: Tympanic membrane, ear canal and external ear normal. There is no impacted cerumen.      Nose: Nose normal. No congestion or rhinorrhea.      Mouth/Throat:      Mouth: Mucous membranes are moist.      Pharynx: Oropharynx is clear. No oropharyngeal exudate or posterior oropharyngeal erythema.   Eyes:      General: Lids are normal. Vision grossly intact. Gaze aligned appropriately.         Right eye: No discharge.         Left eye: No discharge.      Extraocular Movements: Extraocular movements intact.      Conjunctiva/sclera: Conjunctivae normal.      Pupils: Pupils are equal, round, and reactive to light.   Neck:      Thyroid: No thyromegaly.      Vascular: No JVD.      Trachea: Trachea normal.   Cardiovascular:      Rate and Rhythm: Normal rate and regular rhythm.      Heart sounds: No murmur heard.     No friction rub. No gallop.   Pulmonary:      Effort: Pulmonary effort is normal. No respiratory distress.      Breath sounds: Normal breath sounds. No wheezing, rhonchi or rales.   Chest:      Chest wall: No tenderness.   Abdominal:      General: There is no distension.      Palpations: Abdomen is soft. There is no mass.      Tenderness: There is no abdominal tenderness.   Musculoskeletal:         General: Normal range of motion.      Cervical back: Normal range of motion and neck supple. No rigidity.      Right lower leg: No edema.      Left lower leg: No edema.   Lymphadenopathy:      Cervical: No cervical adenopathy.   Skin:     General: Skin is warm and dry.      Coloration: Skin is not jaundiced.      Findings: No rash.   Neurological:      General: No focal deficit present.      Mental Status: He is alert and oriented to person, place, and time. Mental status is at baseline.      Cranial Nerves: No cranial nerve deficit.      Deep  Tendon Reflexes: Reflexes normal.   Psychiatric:         Mood and Affect: Mood normal.         Behavior: Behavior normal.         Thought Content: Thought content normal.         Judgment: Judgment normal.         Assessment & Plan  Routine general medical examination at health care facility    [facility] wellness today   Coronary arteriosclerosis    Orders:    Lipid Panel; Future    Discussed possible statin to help prevent plaque progression checking chol levels   His wife is a nurse and may recall he tried them   Tobacco dependence due to cigarettes    Orders:    CT lung screening low dose; Future    Discussion on quitting smoking again   Sleep disturbance    Orders:    traZODone (Desyrel) 50 mg tablet; Take 1 tablet (50 mg) by mouth as needed at bedtime for sleep.    Education discussed on how to take trazodone and what to look for   Screening for prostate cancer    Orders:    Prostate Specific Antigen; Future    Rheumatoid arthritis involving both hands with negative rheumatoid factor (Multi)  Currently off immunosuppressants -  hx of very severe infections -   Sees DR Chery from Rheumatology          Other emphysema (Multi)  Smoker and several infections          Lung fibrosis (Multi)  Smoker and several infections          Paroxysmal atrial fibrillation (Multi)  He states this was very brief and was taken off anticoagulation          Personal history of malignant melanoma of skin  Sees derm regularly               Will get fasting labs appt     We discussed at visit any disease processes that were of concern as well as the risks, benefits and instructions of any new medication provided.    See orders and discussion section for information provided to patient in their After Visit Summary.   Patient (and/or caretaker of patient if present)  stated all questions were answered, and they voiced understanding of instructions.

## 2025-05-07 NOTE — PATIENT INSTRUCTIONS
"Ask Nicole if you were ever tried on Statins to stabilize plaque       Lets have you try trazodone for sleep  -   Can take 25 mg  (1/2 ) tab  - up to 3 tabs  as needed for sleep  - take about 1 hour before bed      Sleep Hygiene:     If you are having trouble sleeping, you will want to follow these tips:    1) Have a set bedtime and awakening time that you want to stick to every night.      2)  Avoid caffeine - some people can just stop 4 - 5 hours before bedtime, but many people are sensitive to even a small amount in the day.  Remember -  coffee, some teas, energy drinks, diet medications, and chocolate are all known to contain caffeine.       3) Create a \"think about tomorrow\" notepad to keep by your bed.  When you are lying in bed thinking about something that you can't get out of your mind, write it down.  This helps your brain \"let it go\" for the night.      4)  A cool room often helps.      5) If you are lying in bed and just can't fall asleep, then get out of bed.  Find a calm and quiet place, and read something that is VERY BORING until you feel sleepy.  Then go back to bed.  Keep this pattern up until you fall asleep.  Don't forget to wake up at your set awakening time - even if you only fell asleep 2 hours before!   This will help you fall asleep the next night.       6) No napping in the day.      7) White noise often helps - a fan usually works great     8) And finally  - no electronics one hour before bed, and definitely not any in the night.  The light from them stimulates the awake center if the brain.         INSTRUCTIONS FOR LAB APPOINTMENTS AT [facility]     If having the labs done in our office, be sure you make a lab appointment.   Plan to be here at least 5 minutes before your appointment time for registration.   If you have any lab orders, please bring them with you.  If you have been asked to get FASTING labs,   that means we do not eat or drink anything that has any calories in it for 12 hours " "before the lab appointment.   But, DO drink plenty of water so that you are well hydrated for the blood draw.    You are allowed to have water, black coffee, or plain teas.     Please do not have anything \"diet\" - the artificial sugars can sometimes effect the labs.      If you are on Biotin - do not take it for at least 3 days prior to labs being done, especially if you are being tested for thyroid function.   You and your provider should have discussed how you will get your test results.   IF you get your results on My Chart,  you will see your results before your provider does.    Please allow them at least a week to make comments on your labs.  If over a week has gone by and you have not heard from them, then contact the office or message on My Chart.     If results are being mailed, please allow up to 2 weeks to get them.     Contact the office if its been over 2 weeks and you did not receive them.               ABOUT MEDICARE PREVENTATIVE APPOINTMENTS:     YOUR YEARLY MEDICARE WELLNESS VISIT IS VERY IMPORTANT.      Medicare wants all of their patients to schedule their \"Welcome to Medicare\" visit  when you are in the first 12 months of being on Medicare.    Then every year after that, they want you to schedule your  \"Annual Wellness\"  visit.     These visits have a very specific list of topics I have to cover at the visit,  so you will have paperwork you need to complete before I see you.   Of interest,  there is NOT actually a physical exam as part of this visit.       If you are female,   a Well Woman Exam  (Breast exam and pelvic exam) - are paid for by Medicare every 2 years.   Mammograms are paid for every year.    If you are due for this exam too,  the Medicare wellness and the well woman exam can be done on the same day,  PLEASE TELL THIS TO  WHEN MAKING THE APPOINTMENT.      Some of the Medicare plans ALSO cover a traditional \"physical\" - which has more of the physical exam component.   You " "may want to find out if your insurance covers that too.       (this is  the code - 17636)  - That can be added to the visit as well.    You would need to tell us.     IT'S VERY HELPFUL IF YOU KNOW WHAT YOUR PLAN COVERS AHEAD OF THE VISIT.      Please check to see what your plan(s) cover.   (Even checking on testing and vaccines that are covered or not helps us greatly!)     At these appointments ,  IF  we cover any of your chronic medical conditions,  medical concerns,  or medications,  I will also be billing mina  \"E/M  code\" which stands for \"Evaluation and Management\"    -  which is a regular office visit code.    THAT CHARGE MAY BE SUBJECT TO CO-PAYS AND DEDUCTIBLES.     PLEASE DO NOT \"SAVE\" ALL OF YOUR CONCERNS TO GO OVER AT THESE YEARLY WELLNESS VISITS.   YOU SHOULD BE SCHEDULING SEPARATE APPOINTMENTS WHEN YOU HAVE HEALTH CONCERNS TO DISCUSS AND FOR YOUR MEDICATION CHECK UP APPOINTMENTS.        Thank you.         WELL VISIT/PREVENTATIVE VISIT INSTRUCTIONS:        Call [phone] to schedule any testing ordered at [facility].      For a mammogram, call   [phone] .    Please work on healthy nutrition,  optimal sleep, and regular exercise to feel better.    If you smoke - please quit, and if you drink alcohol, please limit your intake.       Be sure to ask me about any vaccines you may be due for,  and ask me any questions you may have about vaccines.   Generally -  a flu shot is recommended every fall for everyone over 6 months of age,  a pneumonia shot is recommended for anyone 65 and over or who has chronic conditions such as diabetes, heart or lung disease,  the shingles vaccines are recommended for people age 50 and over,   a Tetnas/Pertussis booster is very 10 years (after the childhood set);  the RSV vaccine is for people age 65 and over,  and the COVID vaccines are recommended for everyone, but the boosters are often particular people, so ask me if you qualify.      There may be more vaccines " "you qualify for depending on your medical conditions, so be sure to ask me about that if you have any chronic illnesses.    Some people have insurance that will pay for the vaccines at the pharmacy, and some your doctors office - so be sure to check with your insurance about the best place to get your vaccines and your coverage of them.     Generally speaking,  good nutrition consists of lean meats, like chicken, fish and turkey (not fried);    plenty of fruits and vegetables (the fresher the better);   Less sugars, saturated fats, and processed foods - especially those made with white flour.   Try to eat the majority of your grain foods  as whole grains.   Keep an eye on your total calories in a day and serving sizes on packaging - this will help you limit your overall intake.    Remember, to lose weight (if you need to), your total calorie intake has to be about 300 - 500 calories less than what you burn in a day.   That number depends on your age, gender and body size.   Some people find a fitbit (calorie tracking device) helpful.      Please be sure to see the dentist every 6 months.    Please see the eye doctor no longer than every 2 years.    When you have your Living Will and Medical Power of  papers completed   (they have to be witnessed by 2 non-relatives or notarized to be legally binding),     please keep your originals in a safe place in your home, but make sure all your physicians have copies and that you take copies with you when you go to the hospital.        GETTING TEST RESULTS:    YOU WILL ALWAYS FIND OUT ABOUT ALL YOUR TEST RESULTS FROM ME.  I do not use the \"no news is good news\" policy.   The only time you would not, is if I have told you to get the testing done, and make a follow up appointment to go over it.    Then I will be waiting to talk to you at the visit about your test results.     I have a few different ways I get test results to you  (if its something urgent, we call you)  :  " "     If you have a [employer] card -  I will have your test results on your secureach card within a week.  You should get a phone call telling you when the results are on the card, or just call the card in a week.   If two weeks go  by and you have not heard anything, call the card to see if the results are there,  and if not,  leave me a message.  If you are having trouble using [employer], they have a support line you should call :   [phone];   If you have lost your card, I need to know.     IT'S VERY IMPORTANT THAT YOU CALL TO LISTEN TO YOUR RESULTS, AS IT THEN LETS ME KNOW YOU GOT YOUR RESULTS.        If you get your test results on MY CHART,  you may commonly see your results even before I do.      I will always annotate a message on your results so you know that I saw them and how I feel about them.     If you need some help with MY CHART call the support number at [phone].    IF I mail your results to you,   I need to hear from you if you do not receive them within 2 weeks.      Be sure to let me know your preference for getting results.         BILLING FOR PREVENTATIVE VISITS.     Most insurance companies pay for a yearly \"Wellness Check\"  or they may call it a \"Preventative Physical\"   - but it's important to know what your plan covers ahead of the visit.    It's also a good idea to find out what sort of preventative testing they will cover for screening tests - like cholesterol, blood sugar, or colonoscopies. Also, find out which vaccines are covered and if you have any responsibility in paying for them.       If at your Wellness Visit,  we cover anything to do with problems/issues  you are having or  chronic medications/ medical conditions you have,   there will ALSO be a regular office charge that day.     Blood work  or testing obtained that has anything to do with an acute issue or chronic condition is billed under that diagnosis and not screening.       It's a good idea to find out " from your insurance what is covered and what is your responsibility for all of the above possible charges.           Most importantly,   if you ever have any questions or concerns that cannot wait until your next visit with me,  you can message me through MY CHART or call the office and leave a voice mail message  (please allow for at least 24 hours for responses for these messages).       Take good care.   Dr Sellers

## 2025-05-09 PROBLEM — Z85.820 PERSONAL HISTORY OF MALIGNANT MELANOMA OF SKIN: Status: ACTIVE | Noted: 2025-01-09

## 2025-05-09 ASSESSMENT — VISUAL ACUITY: OU: 1

## 2025-05-10 NOTE — ASSESSMENT & PLAN NOTE
Currently off immunosuppressants -  hx of very severe infections -   Sees DR Chery from Rheumatology

## 2025-05-13 ENCOUNTER — APPOINTMENT (OUTPATIENT)
Dept: PRIMARY CARE | Facility: CLINIC | Age: 70
End: 2025-05-13
Payer: MEDICARE

## 2025-05-13 DIAGNOSIS — Z12.5 SCREENING FOR PROSTATE CANCER: ICD-10-CM

## 2025-05-13 DIAGNOSIS — I25.10 CORONARY ARTERIOSCLEROSIS: ICD-10-CM

## 2025-05-14 LAB
CHOLEST SERPL-MCNC: 156 MG/DL
CHOLEST/HDLC SERPL: 3.3 (CALC)
HDLC SERPL-MCNC: 47 MG/DL
LDLC SERPL CALC-MCNC: 93 MG/DL (CALC)
NONHDLC SERPL-MCNC: 109 MG/DL (CALC)
PSA SERPL-MCNC: 1.03 NG/ML
TRIGL SERPL-MCNC: 75 MG/DL

## 2025-05-28 DIAGNOSIS — R19.7 ACUTE DIARRHEA: Primary | ICD-10-CM

## 2025-05-28 NOTE — PROGRESS NOTES
Wife called yesterday AM -   They are on a trip   Has severe diarrhea, fever and sinus congestion -     Would like to do stool studies when they get back this AM     Orders placed  -   Messaged them and front staff to have supplies ready

## 2025-05-29 ENCOUNTER — OFFICE VISIT (OUTPATIENT)
Dept: PRIMARY CARE | Facility: CLINIC | Age: 70
End: 2025-05-29
Payer: MEDICARE

## 2025-05-29 ENCOUNTER — TELEPHONE (OUTPATIENT)
Dept: RHEUMATOLOGY | Facility: CLINIC | Age: 70
End: 2025-05-29

## 2025-05-29 VITALS
DIASTOLIC BLOOD PRESSURE: 66 MMHG | OXYGEN SATURATION: 100 % | HEART RATE: 81 BPM | SYSTOLIC BLOOD PRESSURE: 118 MMHG | TEMPERATURE: 98 F | WEIGHT: 188 LBS | BODY MASS INDEX: 26.22 KG/M2

## 2025-05-29 DIAGNOSIS — A04.72 C. DIFFICILE DIARRHEA: Primary | ICD-10-CM

## 2025-05-29 DIAGNOSIS — M13.80 SERONEGATIVE INFLAMMATORY ARTHRITIS: ICD-10-CM

## 2025-05-29 LAB
C COLI+JEJUNI+LARI FUSA STL QL NAA+PROBE: NOT DETECTED
C DIFF TOX GENS STL QL NAA+PROBE: DETECTED
CRYPTOSP AG STL QL IA: NORMAL
EC STX1 GENE STL QL NAA+PROBE: NOT DETECTED
EC STX2 GENE STL QL NAA+PROBE: NOT DETECTED
G LAMBLIA AG STL QL IA: NORMAL
NOROV GI+II ORF1-ORF2 JNC STL QL NAA+PR: NOT DETECTED
O+P STL CONC: NORMAL
O+P STL TRI STN: NORMAL
RVA NSP5 STL QL NAA+PROBE: NOT DETECTED
SALMONELLA SP RPOD STL QL NAA+PROBE: NOT DETECTED
SHIGELLA DNA SPEC QL NAA+PROBE: NOT DETECTED
V CHOL+PARA RFBL+TRKH+TNAA STL QL NAA+PR: NOT DETECTED
Y ENTERO RECN STL QL NAA+PROBE: NOT DETECTED

## 2025-05-29 PROCEDURE — 99213 OFFICE O/P EST LOW 20 MIN: CPT | Performed by: FAMILY MEDICINE

## 2025-05-29 PROCEDURE — 1159F MED LIST DOCD IN RCRD: CPT | Performed by: FAMILY MEDICINE

## 2025-05-29 PROCEDURE — 1160F RVW MEDS BY RX/DR IN RCRD: CPT | Performed by: FAMILY MEDICINE

## 2025-05-29 RX ORDER — OXYCODONE AND ACETAMINOPHEN 5; 325 MG/1; MG/1
1 TABLET ORAL EVERY 6 HOURS PRN
Qty: 110 TABLET | Refills: 0 | Status: SHIPPED | OUTPATIENT
Start: 2025-05-29 | End: 2025-06-28

## 2025-05-29 RX ORDER — OXYCODONE AND ACETAMINOPHEN 7.5; 325 MG/1; MG/1
1 TABLET ORAL EVERY 6 HOURS PRN
Qty: 100 TABLET | Refills: 0 | Status: CANCELLED | OUTPATIENT
Start: 2025-05-29 | End: 2025-06-28

## 2025-05-29 RX ORDER — VANCOMYCIN HYDROCHLORIDE 125 MG/1
125 CAPSULE ORAL 4 TIMES DAILY
Qty: 40 CAPSULE | Refills: 0 | Status: SHIPPED | OUTPATIENT
Start: 2025-05-29 | End: 2025-06-08

## 2025-05-29 NOTE — PROGRESS NOTES
Subjective   Patient ID: Salvador Ocampo is a 69 y.o. male who presents for Sinusitis and diarrehea     Sinusitis         Here with wife     Was in [facility]  -    Was doing well -   Spent 9 hours in [place] airport -  The next day - sinuses were terrible     Then a week ago - lost control of bowels     Painful gas   Terrible diarrhea  - 6 - 7 times a day   8- 9 days now   Losing wt -   No blood  Stabbing abdominal pain     C DIFF POS on labs  -faxed to me     Review of Systems    Objective   /66 (BP Location: Right arm, Patient Position: Sitting, BP Cuff Size: Large adult)   Pulse 81   Temp 36.7 °C (98 °F) (Oral)   Wt 85.3 kg (188 lb)   SpO2 100%   BMI 26.22 kg/m²     Physical Exam  Vitals reviewed.   Constitutional:       General: He is not in acute distress.     Appearance: Normal appearance. He is ill-appearing. He is not toxic-appearing.   HENT:      Right Ear: Tympanic membrane normal.      Left Ear: Tympanic membrane normal.      Nose: No congestion.   Eyes:      Extraocular Movements: Extraocular movements intact.      Pupils: Pupils are equal, round, and reactive to light.   Cardiovascular:      Rate and Rhythm: Normal rate and regular rhythm.   Pulmonary:      Effort: Pulmonary effort is normal.      Breath sounds: Normal breath sounds.   Abdominal:      General: There is no distension.      Tenderness: There is abdominal tenderness (lower abdomen). There is no guarding or rebound.   Neurological:      Mental Status: He is alert.         Assessment/Plan   Assessment & Plan  C. difficile diarrhea    Orders:    vancomycin (Vancocin) 125 mg capsule; Take 1 capsule (125 mg) by mouth 4 times a day for 10 days.      Education on isolation   Contact precautions   Hydration   Signs and sx to go to ER   Trying vanco     We discussed at visit any disease processes that were of concern as well as the risks, benefits and instructions of any new medication provided.    See orders and discussion section for  information provided to patient in their After Visit Summary.   Patient (and/or caretaker of patient if present)  stated all questions were answered, and they voiced understanding of instructions.

## 2025-05-29 NOTE — PATIENT INSTRUCTIONS
Vomiting and Diarrhea:    Stage 1  (recurrent vomiting and/or severe diarrhea) -     For children under the age of 1 - see [place] handout.     For over the age of 1  -      Hydrate  slowly with  pedialyte or gatorade (frost) - small amounts frequently - after the patient is  keeping this down without a problem  (at least 8 hours) - then you can advance to other liquids      Stage 2   (vomiting has slowed or diarrhea has slowed)   -     For Children under 1 year old -  slowly add back formula  or breast feed often.      If you use formula - you can mix the formula as usual with water - but then dilute it in the bottle with the pedialyte to thin it out.  (DO NOT USE THE PEDIALYTE IN PLACE OF WATER TO MIX THE FORMULA POWDER)   After tolerating that well - then can slowly increase the amount of formula in the bottle.        For over the age of 1 -   You start on  a clear liquid diet -     Flat clear sodas like sprite, gingerale or  7-up can be added in to the diet, as well as other clear liquids such as soup broth, jello or popcicles   - no fruit juices, dairy or dark mirta (they make diarrhea worse),  Then, when doing well with the clear liquid diet for a whole day, then you can advance.      Stage 3 (vomiting has stopped, mild diarrhea) -      For under the age of 1 -  just maintain the breastfeeding as prior to being sick , or formula.   If the child has been on table foods - can slowly re-introduce the foods that are soft and bland.     For over the age of 1 -   the clear liquids along with soft and bland foods, such as:  cooked noodles, cooked rice, bread or crackers.   Only when doing well with this  for at least 12 hours (even 24)  can you then go back to regular diet.  (still nothing too spicy or harsh and avoid dairy for about a week)        Signs to call - can't keep any liquid down for at least 20 minutes, or if this goes on longer than 3 - 5 days total;    Signs of dehydration are lethargy (not wanting to  stay awake),   dry mouth,  less urination, fast heart rate.   All those things are very concerning - and if severe - the patient should be taken to the hospital.        Taking a probiotic may also help.         If you need a medication for pain or fever,  tylenol is less irritating to the stomach than ibuprofen.               TREATMENT FOR SINUSITIS AND UPPER RESPIRATORY INFECTIONS:     Drink plenty of fluids, especially water.     Used humidifiers, steam, hot liquids to moisten your nasal passages.      Saline nasal spray often helps,  Simply Saline is a nice over the counter saline spray that you can use as much as you want.       Mucinex or guaifenesin is an over the counter medication that often helps loosen the mucous.  DO NOT USE IN CHILDREN UNDER 6 YEARS OF AGE.      Please be sure to call or follow-up if you are not better in 5-10 days, or if you are worsening.      The most common cause of upper respiratory infections are viruses - which no not need an antibiotic to get better.   We want your own immune system to fight the virus so you or your child develop immunity to it.    However,  people can develop pneumonia, sinus infections and sometimes ear infections from a virus  - which may need an antibiotic.   So if you are showing signs of breathing issues,  or severe ear pain or facial pain with fevers, of if you are no better after 10 days , its important that you contact us.        If you are prescribed an antibiotic,  it's a good idea to take a probiotic to help prevent diarrhea and yeast infections.  This would be eating a yogurt daily or taking something like acidophillus or Culturelle.

## 2025-05-30 ENCOUNTER — TELEPHONE (OUTPATIENT)
Dept: PRIMARY CARE | Facility: CLINIC | Age: 70
End: 2025-05-30
Payer: MEDICARE

## 2025-06-02 ENCOUNTER — APPOINTMENT (OUTPATIENT)
Dept: NEUROLOGY | Facility: CLINIC | Age: 70
End: 2025-06-02
Payer: MEDICARE

## 2025-06-05 LAB
C COLI+JEJUNI+LARI FUSA STL QL NAA+PROBE: NOT DETECTED
CRYPTOSP AG STL QL IA: NORMAL
EC STX1 GENE STL QL NAA+PROBE: NOT DETECTED
EC STX2 GENE STL QL NAA+PROBE: NOT DETECTED
G LAMBLIA AG STL QL IA: NORMAL
NOROV GI+II ORF1-ORF2 JNC STL QL NAA+PR: NOT DETECTED
O+P STL TRI STN: NORMAL
RVA NSP5 STL QL NAA+PROBE: NOT DETECTED
SALMONELLA SP RPOD STL QL NAA+PROBE: NOT DETECTED
SHIGELLA DNA SPEC QL NAA+PROBE: NOT DETECTED
V CHOL+PARA RFBL+TRKH+TNAA STL QL NAA+PR: NOT DETECTED
Y ENTERO RECN STL QL NAA+PROBE: NOT DETECTED

## 2025-06-23 ENCOUNTER — APPOINTMENT (OUTPATIENT)
Dept: RHEUMATOLOGY | Facility: CLINIC | Age: 70
End: 2025-06-23
Payer: MEDICARE

## 2025-06-23 VITALS
DIASTOLIC BLOOD PRESSURE: 70 MMHG | HEART RATE: 68 BPM | SYSTOLIC BLOOD PRESSURE: 116 MMHG | BODY MASS INDEX: 25.94 KG/M2 | WEIGHT: 186 LBS | OXYGEN SATURATION: 97 %

## 2025-06-23 DIAGNOSIS — M47.817 LUMBOSACRAL SPONDYLOSIS WITHOUT MYELOPATHY: ICD-10-CM

## 2025-06-23 DIAGNOSIS — Z79.891 ENCOUNTER FOR LONG-TERM OPIATE ANALGESIC USE: ICD-10-CM

## 2025-06-23 DIAGNOSIS — M13.80 SERONEGATIVE INFLAMMATORY ARTHRITIS: Primary | ICD-10-CM

## 2025-06-23 PROCEDURE — 1125F AMNT PAIN NOTED PAIN PRSNT: CPT | Performed by: INTERNAL MEDICINE

## 2025-06-23 PROCEDURE — 1159F MED LIST DOCD IN RCRD: CPT | Performed by: INTERNAL MEDICINE

## 2025-06-23 PROCEDURE — 99214 OFFICE O/P EST MOD 30 MIN: CPT | Performed by: INTERNAL MEDICINE

## 2025-06-23 PROCEDURE — G2211 COMPLEX E/M VISIT ADD ON: HCPCS | Performed by: INTERNAL MEDICINE

## 2025-06-23 RX ORDER — OXYCODONE AND ACETAMINOPHEN 7.5; 325 MG/1; MG/1
1 TABLET ORAL EVERY 6 HOURS PRN
Qty: 100 TABLET | Refills: 0 | Status: SHIPPED | OUTPATIENT
Start: 2025-06-23 | End: 2025-06-24 | Stop reason: SDUPTHER

## 2025-06-23 ASSESSMENT — ENCOUNTER SYMPTOMS
SLEEP DISTURBANCE: 1
FATIGUE: 1
UNEXPECTED WEIGHT CHANGE: 1

## 2025-06-23 ASSESSMENT — PATIENT HEALTH QUESTIONNAIRE - PHQ9
2. FEELING DOWN, DEPRESSED OR HOPELESS: SEVERAL DAYS
1. LITTLE INTEREST OR PLEASURE IN DOING THINGS: SEVERAL DAYS
SUM OF ALL RESPONSES TO PHQ9 QUESTIONS 1 AND 2: 2

## 2025-06-23 ASSESSMENT — PAIN SCALES - GENERAL: PAINLEVEL_OUTOF10: 7

## 2025-06-23 NOTE — PROGRESS NOTES
Subjective   Patient ID: Salvador Ocampo is a 70 y.o. male who presents for Follow-up.  HPI  Patient with history of seronegative rheumatoid arthritis with also history of positive QuantiFERON TB Gold status post treatment.  Had only been on 1 dose of Enbrel before being diagnosed with melanoma.  Has been on Orencia and Rituxan.  Last Rituxan infusion in late 2022.  History of MAC infection.      He is accompanied by his wife today.  He was last seen In March and at that time we had him continue on oxycodone/acetaminophen 5/325.  Checked [place] last fill was 5/29/2025 #110.  In the interim they were in [place] to visit family. On the flight back he started having diarrhea.  Has been diagnosed with C. difficile after Memorial Day weekend and was recently treated with antibiotic.  He has lost weight  Still has been very achy.  He does not have the severe pain like he did before his spine surgery but still has chronic lower extremity symptoms and neck and shoulder discomfort.  Sometimes his hands will also get achy.    Review of Systems   Constitutional:  Positive for fatigue and unexpected weight change.   Gastrointestinal:         Recent C. difficile   Musculoskeletal:         Per HPI   Psychiatric/Behavioral:  Positive for sleep disturbance.        Objective   Physical Exam  GEN: NAD A&O x3 appropriate affect  No large swelling in the DIP PIP MCPs.  Hypertrophic changes some decreased range of motion and difficulty with full extension of his fingers no swelling elbows or shoulders  Assessment/Plan     Seronegative rheumatoid arthritis with positive quantiferon TB gold has been treated. Patient with history of melanoma. He was only on Enbrel for 1 month before being diagnosed with melanoma . Has been on orenica previously. His last Rituxan was in late 2022.  History of MAC infection and was on antibiotics for 24 months and he recently discontinued    -Not on any DMARD therapy and at this time I am reluctant about  restarting him again.      Currently given pain medication and previously had been on oxycodone/acetaminophen 7.5/325 and now on 5/325.  Has been having increasing pain and does not feel that the 5/325 Has been holding his pain symptoms.  We are unable to put him back on any biologic therapy since he has been at risk of infection unfortunately.  He is up-to-date on his urine drug screen and controlled substance contract.  Will see him again in 3 months or as needed    OARRS:  Suni Chery MD on 6/23/2025 12:28 PM  I have personally reviewed the OARRS report for Salvador Ocampo. I have considered the risks of abuse, dependence, addiction and diversion    Is the patient prescribed a combination of a benzodiazepine and opioid?  Yes, I feel it is clincially indicated to continue the medication and have discussed with the patient risks/benefits/alternatives.    Last Urine Drug Screen / ordered today: 8/5/2024     Controlled Substance Agreement:  Date of the Last Agreement: 8/5/2024  Reviewed Controlled Substance Agreement including but not limited to the benefits, risks, and alternatives to treatment with a Controlled Substance medication(s).    Opioids:  What is the patient's goal of therapy?  Help with pain  Is this being achieved with current treatment?  Help with pain  I feel that it is clinically indicated to continue this current medication regimen after consideration of alternative therapies, and other non-opioid treatment.    Opioid Risk Screening:  No data recorded    Pain Assessment:  No data recorded    Suni Chery MD 06/23/25 5:22 PM

## 2025-06-24 DIAGNOSIS — M13.80 SERONEGATIVE INFLAMMATORY ARTHRITIS: ICD-10-CM

## 2025-06-24 DIAGNOSIS — M47.817 LUMBOSACRAL SPONDYLOSIS WITHOUT MYELOPATHY: ICD-10-CM

## 2025-06-24 RX ORDER — OXYCODONE AND ACETAMINOPHEN 7.5; 325 MG/1; MG/1
1 TABLET ORAL EVERY 6 HOURS PRN
Qty: 100 TABLET | Refills: 0 | Status: SHIPPED | OUTPATIENT
Start: 2025-06-24 | End: 2025-07-24

## 2025-07-02 ENCOUNTER — APPOINTMENT (OUTPATIENT)
Dept: NEUROLOGY | Facility: CLINIC | Age: 70
End: 2025-07-02
Payer: MEDICARE

## 2025-07-02 ENCOUNTER — HOSPITAL ENCOUNTER (OUTPATIENT)
Dept: RADIOLOGY | Facility: HOSPITAL | Age: 70
Discharge: HOME | End: 2025-07-02
Payer: MEDICARE

## 2025-07-02 VITALS
HEIGHT: 69 IN | WEIGHT: 189 LBS | SYSTOLIC BLOOD PRESSURE: 116 MMHG | HEART RATE: 72 BPM | DIASTOLIC BLOOD PRESSURE: 56 MMHG | BODY MASS INDEX: 27.99 KG/M2 | TEMPERATURE: 97.1 F

## 2025-07-02 DIAGNOSIS — M54.16 LUMBAR RADICULOPATHY: ICD-10-CM

## 2025-07-02 DIAGNOSIS — G25.0 BENIGN ESSENTIAL TREMOR: Primary | ICD-10-CM

## 2025-07-02 DIAGNOSIS — I71.43 INFRARENAL ABDOMINAL AORTIC ANEURYSM (AAA) WITHOUT RUPTURE: ICD-10-CM

## 2025-07-02 PROCEDURE — 74174 CTA ABD&PLVS W/CONTRAST: CPT | Performed by: RADIOLOGY

## 2025-07-02 PROCEDURE — 1160F RVW MEDS BY RX/DR IN RCRD: CPT | Performed by: PSYCHIATRY & NEUROLOGY

## 2025-07-02 PROCEDURE — 74174 CTA ABD&PLVS W/CONTRAST: CPT

## 2025-07-02 PROCEDURE — 2550000001 HC RX 255 CONTRASTS: Performed by: INTERNAL MEDICINE

## 2025-07-02 PROCEDURE — 3008F BODY MASS INDEX DOCD: CPT | Performed by: PSYCHIATRY & NEUROLOGY

## 2025-07-02 PROCEDURE — 1159F MED LIST DOCD IN RCRD: CPT | Performed by: PSYCHIATRY & NEUROLOGY

## 2025-07-02 PROCEDURE — 99213 OFFICE O/P EST LOW 20 MIN: CPT | Performed by: PSYCHIATRY & NEUROLOGY

## 2025-07-02 RX ADMIN — IOHEXOL 75 ML: 350 INJECTION, SOLUTION INTRAVENOUS at 08:13

## 2025-07-02 NOTE — PROGRESS NOTES
"NEUROLOGY OUTPATIENT FOLLOW-UP NOTE    Assessment/Plan   Diagnoses and all orders for this visit:  Benign essential tremor  Lumbar radiculopathy      IMPRESSION:  Stable essential tremor. Improving lumbar radiculopathy referable to spondylosis.      PLAN:  To continue primidone for the tremor. I will see him in a year or prn.      Benson Medley Jr., M.D., FAAN   ----------    Subjective     Salvador Ocampo is a 70 y.o. year old male here for follow-up.        Medical History[1]  Surgical History[2]  Social History     Tobacco Use    Smoking status: Every Day     Types: Cigarettes    Smokeless tobacco: Never   Substance Use Topics    Alcohol use: Yes     Comment: Social     family history is not on file.  Current Medications[3]  Allergies[4]    Objective     /56   Pulse 72   Temp 36.2 °C (97.1 °F)   Ht 1.753 m (5' 9\")   Wt 85.7 kg (189 lb)   BMI 27.91 kg/m²     CONSTITUTIONAL:  No acute distress    MENTAL STATUS:  Awake, alert, fully oriented to self, place, and time, with present short-term memory, good awareness of recent events, normal attention span, concentration, and fund of knowledge.    SPEECH AND LANGUAGE:  Can name and repeat, follows all commands, has no dysarthria    CRANIAL NERVES:  II-Vision present, visual fields full to confrontational testing    III/IV/VI--EOMs are present in all directions.  Pupils are symmetrically reactive in dim light.  No ptosis.    V--Normal facial sensation.    VII--No facial asymmetry.    VIII--Hearing present to voice bilaterally.    IX/X--Symmetric soft palate rise.    XI--Normal trapezius power bilaterally.    XII--Tongue protrudes without deviation.    MOTOR:  Normal power, tone, and bulk in both arms and both legs.  No significant tremor today.     SENSORY:  Reduced pin sensation on the left lateral shin  Otherwise normal pin sensation in both arms and both legs without distal-proximal gradient, asymmetry, or spinal sensory level.     COORDINATION:  " Normal finger-to-nose and heel-to-shin testing in both arms and both legs.    REFLEXES are normal and symmetric at the biceps, triceps, brachioradialis, patella, and ankle.  The plantar responses are flexor.    GAIT is normal, without steppage, ataxia, shuffling, or spasticity.      Benson Medley Jr., M.D., FAAN         [1]   Past Medical History:  Diagnosis Date    Acute disease due to severe acute respiratory syndrome coronavirus 2 (SARS-CoV-2) 01/09/2025    Diarrhea 01/09/2025    Fracture of distal phalanx of thumb 01/09/2025    Fracture of tibial plateau 01/09/2025    Knee pain 01/09/2025    Laceration without foreign body of right thumb with damage to nail, initial encounter 06/13/2019    Laceration of right thumb without foreign body with damage to nail, initial encounter    Leukocytosis 01/09/2025    Personal history of malignant melanoma of skin 10/08/2020    History of malignant melanoma of skin    Personal history of other diseases of the circulatory system     History of hypertension    Personal history of other diseases of the musculoskeletal system and connective tissue     History of rheumatoid arthritis    Personal history of other specified conditions 04/01/2019    History of diarrhea   [2]   Past Surgical History:  Procedure Laterality Date    APPENDECTOMY  05/14/2018    Appendectomy    CERVICAL FUSION  05/14/2018    Cervical Vertebral Fusion    LUMBAR LAMINECTOMY  05/14/2018    Laminectomy Lumbar    OTHER SURGICAL HISTORY  05/14/2018    Hip Replacement Bilateral    OTHER SURGICAL HISTORY  02/15/2022    Tibia fracture repair    OTHER SURGICAL HISTORY  11/13/2018    Excision melanoma    OTHER SURGICAL HISTORY  11/13/2018    Ureteral stent placement   [3]   Current Outpatient Medications:     albuterol (Ventolin HFA) 90 mcg/actuation inhaler, Inhale 2 puffs every 4 hours if needed for wheezing or shortness of breath. (Patient not taking: Reported on 5/7/2025), Disp: 8 g, Rfl: 5    cetirizine  (ZyrTEC) 10 mg capsule, Take by mouth once daily., Disp: , Rfl:     oxyCODONE-acetaminophen (Percocet) 7.5-325 mg tablet, Take 1 tablet by mouth every 6 hours if needed for severe pain (7 - 10)., Disp: 100 tablet, Rfl: 0    primidone (Mysoline) 50 mg tablet, Take 1 tablet (50 mg) by mouth once daily at bedtime., Disp: 90 tablet, Rfl: 3    sildenafil (Viagra) 100 mg tablet, Take 0.5-1 tablets ( mg) by mouth once daily as needed for erectile dysfunction., Disp: 30 tablet, Rfl: 1    traZODone (Desyrel) 50 mg tablet, Take 1 tablet (50 mg) by mouth as needed at bedtime for sleep., Disp: 30 tablet, Rfl: 0  No current facility-administered medications for this visit.  [4]   Allergies  Allergen Reactions    Gabapentin Dizziness    Codeine Unknown    Ibuprofen GI bleeding    Nsaids (Non-Steroidal Anti-Inflammatory Drug) GI bleeding

## 2025-07-11 ENCOUNTER — OFFICE VISIT (OUTPATIENT)
Dept: VASCULAR SURGERY | Facility: HOSPITAL | Age: 70
End: 2025-07-11
Payer: MEDICARE

## 2025-07-11 VITALS
DIASTOLIC BLOOD PRESSURE: 78 MMHG | HEART RATE: 88 BPM | BODY MASS INDEX: 27.32 KG/M2 | SYSTOLIC BLOOD PRESSURE: 137 MMHG | OXYGEN SATURATION: 98 % | WEIGHT: 185 LBS

## 2025-07-11 DIAGNOSIS — Z01.810 PREOP CARDIOVASCULAR EXAM: Primary | ICD-10-CM

## 2025-07-11 DIAGNOSIS — I71.43 INFRARENAL ABDOMINAL AORTIC ANEURYSM (AAA) WITHOUT RUPTURE: ICD-10-CM

## 2025-07-11 DIAGNOSIS — R09.89 OTHER SPECIFIED SYMPTOMS AND SIGNS INVOLVING THE CIRCULATORY AND RESPIRATORY SYSTEMS: ICD-10-CM

## 2025-07-11 PROCEDURE — 99214 OFFICE O/P EST MOD 30 MIN: CPT | Performed by: NURSE PRACTITIONER

## 2025-07-11 PROCEDURE — 99204 OFFICE O/P NEW MOD 45 MIN: CPT | Performed by: NURSE PRACTITIONER

## 2025-07-16 ENCOUNTER — HOSPITAL ENCOUNTER (OUTPATIENT)
Facility: HOSPITAL | Age: 70
Setting detail: SURGERY ADMIT
End: 2025-07-16
Attending: SURGERY | Admitting: SURGERY
Payer: MEDICARE

## 2025-07-17 NOTE — PROGRESS NOTES
Vascular Surgery Clinic Note    Referring provider: Joselyn Mosqueda MD  [address] [place] [address]  Date of visit: 07/11/2025  9:30 AM EDT  Location of visit: [facility]    CC: NPV AAA    History Of Present Illness:   Salvador Ocampo is a 70 y.o. male with PMH of CAD, afib (no AC), RA, chronic low back pain, c diff and diverticulosis here for initial evaluation of abdominal aortic aneurysm. He has known of his aneurysm for the past 10-15 years and has been monitored with serial imaging by Dr. Mosqueda. He denies abdominal or back pain that may be attributed to his aneurysm. His brother has an abdominal aortic aneurysm that is being monitored for growth.     He denies claudication symptoms and is able to walk a mile distance. He denies amaurosis fugax or TIA symptoms.     PSH: open appendectomy   SH: 1 ppd smoker, lives with wife, 2 kids     Medical History:  Problem List[1]     SH:    Social Drivers of Health     Tobacco Use: High Risk (7/2/2025)    Patient History     Smoking Tobacco Use: Every Day     Smokeless Tobacco Use: Never     Passive Exposure: Not on file   Alcohol Use: Not on file   Financial Resource Strain: Not on file   Food Insecurity: Not on file   Transportation Needs: Not on file   Physical Activity: Not on file   Stress: Not on file   Social Connections: Not on file   Intimate Partner Violence: Not on file   Depression: Not at risk (6/23/2025)    PHQ-2     PHQ-2 Score: 2   Housing Stability: Not on file   Utilities: Not on file   Digital Equity: Not on file   Health Literacy: Not on file        FH:  Family History[2]     Allergies:   RX Allergies[3]    ROS:  All systems were reviewed and noted to be negative, other than described above.     Objective:  Last Recorded Vitals  Vitals:    07/11/25 0910 07/11/25 0911   BP: 152/79 137/78   BP Location: Right arm Left arm   Patient Position: Sitting Sitting   Pulse: 87 88   SpO2: 98% 98%   Weight: 83.9 kg (185 lb)        Meds:   Current  Outpatient Medications   Medication Instructions    albuterol (Ventolin HFA) 90 mcg/actuation inhaler 2 puffs, inhalation, Every 4 hours PRN    cetirizine (ZyrTEC) 10 mg capsule Daily RT    oxyCODONE-acetaminophen (Percocet) 7.5-325 mg tablet 1 tablet, oral, Every 6 hours PRN    primidone (MYSOLINE) 50 mg, oral, Nightly    sildenafil (VIAGRA)  mg, oral, Daily PRN    traZODone (DESYREL) 50 mg, oral, Nightly PRN       Exam:  Constitutional: Well appearing, NAD   PSYCH: Appropriate mood and affect  Eyes: Sclera clear  Neck: Supple   CV: No tachycardia  RESP: Unlabored breathing  GI: Soft, nontender, non-distended, non-tender pulsatile abdominal aortic aneurysm  SKIN: No lesions  NEURO: No focal deficits noted. Motor/sensory intact.   EXTREMITIES: Warm & well perfused. No leg edema. No evidence of arterial ischemia. No evidence of venous insufficiency.   PULSES: palpable radial, femoral and DP pulse bilaterally. Palpable right PT pulse.     Imaging Reviewed:  CT angio abdomen pelvis w and or wo IV IV contrast 07/02/2025    Narrative  Interpreted By:  Brady Garrison,  STUDY:  CT ANGIO ABDOMEN PELVIS W AND/OR WO IV IV CONTRAST;  7/2/2025 8:13 am    INDICATION:  Signs/Symptoms:abdominal aortic aneurysm    COMPARISON:  None.    ACCESSION NUMBER(S):  [id number]    ORDERING CLINICIAN:  BERNARDA ASENCIO    TECHNIQUE:  Thin-section axial images of the abdomen and pelvis in the arterial  phase after intravenous administration of  75 mL Omnipaque 350.  Sagittal and coronal reconstructions. 3D reconstructions were  obtained at a separate workstation.    FINDINGS:  Vascular: Infrarenal abdominal aortic aneurysm present measuring 4.6  x 5.2 cm in the transverse plane and 6.3 cm in the craniocaudal  plane. Aneurysm arises proximally 2.2 cm below the right renal artery  takeoff. Minimal eccentric mural thrombus present..    The celiac trunk, superior mesenteric artery, and renal arteries are  patent.  Inferior mesenteric artery is  patent although there is  severe stenosis at the origin which is incorporated within the  inferior aspect of the aortic aneurysm.    LOWER CHEST: No acute abnormality of the lung bases.  BONES: No acute osseous abnormality.  ABDOMINAL WALL: Within normal limits.    ABDOMEN:    LIVER: Within normal limits.  BILE DUCTS: Normal caliber.  GALLBLADDER: No calcified gallstones. No wall thickening.  PANCREAS: Within normal limits.  SPLEEN: Within normal limits.  ADRENALS: Within normal limits.  KIDNEYS and URETERS: Symmetric renal enhancement. No hydronephrosis.  4 mm non-obstructing stone about the left kidney. Right renal cysts.    RETROPERITONEUM: No pathologically enlarged retroperitoneal lymph  nodes.    PELVIS:    REPRODUCTIVE ORGANS: No pelvic masses.  BLADDER: Within normal limits.    BOWEL: No obstruction. Appendix not discretely identified. Distal  colonic diverticulosis. No acute inflammatory changes. PERITONEUM: No  ascites or free air, no fluid collection.    Impression  1. Infrarenal abdominal aortic aneurysm measuring 4.6 x 5.2 x 6.3 cm.  2. No acute process within the abdomen or pelvis.  3. 4 mm non-obstructing stone about the left kidney.    MACRO:  none    Signed by: Brady Garrison 7/3/2025 5:09 PM  Dictation workstation:   [id number]      Assessment & Plan:  1. Preop cardiovascular exam  Nuclear Stress Test    Vascular US Carotid Artery Duplex Bilateral      2. Infrarenal abdominal aortic aneurysm (AAA) without rupture  Referral to Vascular Surgery    Case Request Operating Room: REPAIR, AORTA, ABDOMINAL, ENDOVASCULAR    Nuclear Stress Test    Vascular US Carotid Artery Duplex Bilateral      3. Other specified symptoms and signs involving the circulatory and respiratory systems  Vascular US Carotid Artery Duplex Bilateral        #AAA 5.2 x 5.2 cm  Discussed endo vs open - patient wishes to proceed with endovascular repair.   Aspirin & statin   Blood pressure control  Obtain stress test & carotid duplex    Plan for EVAR     Tobacco Counseling  3 - 5 minutes were spent counseling the patient on tobacco cessation.  Benefits of cessation were discussed as well as techniques to help quit.     Mi Romo, APRN-CNP         [1]   Patient Active Problem List  Diagnosis    Abdominal aortic aneurysm without rupture    Acquired trigger finger    Benign essential tremor    Degeneration of cervical intervertebral disc    Depression with anxiety    Lumbosacral spondylosis without myelopathy    Diverticulitis    LLQ abdominal tenderness    Latent tuberculosis by blood test    Lung fibrosis (Multi)    Mixed hyperlipidemia    Paroxysmal atrial fibrillation (Multi)    S/P hip replacement    Rheumatoid arthritis of hand    Tobacco use disorder    Vitamin D deficiency    Anxiety    Erectile dysfunction    Interstitial pulmonary disease (Multi)    Lumbar radiculopathy    Personal history of malignant melanoma of skin    Memory impairment    Muscle weakness of extremity    Narcotic drug use    S/P lumbar laminectomy    Spinal stenosis of lumbar region    Ulnar neuropathy   [2] No family history on file.  [3]   Allergies  Allergen Reactions    Gabapentin Dizziness    Codeine Unknown    Ibuprofen GI bleeding    Nsaids (Non-Steroidal Anti-Inflammatory Drug) GI bleeding

## 2025-07-23 DIAGNOSIS — M47.817 LUMBOSACRAL SPONDYLOSIS WITHOUT MYELOPATHY: ICD-10-CM

## 2025-07-23 DIAGNOSIS — M13.80 SERONEGATIVE INFLAMMATORY ARTHRITIS: ICD-10-CM

## 2025-07-23 RX ORDER — OXYCODONE AND ACETAMINOPHEN 7.5; 325 MG/1; MG/1
1 TABLET ORAL EVERY 6 HOURS PRN
Qty: 100 TABLET | Refills: 0 | Status: SHIPPED | OUTPATIENT
Start: 2025-07-23 | End: 2025-08-22

## 2025-07-23 NOTE — TELEPHONE ENCOUNTER
Patient LM on Refill line    Rx: Percocet 7.5/325mg tablet  Sig: one tablet every 6 hr prn  Qty: 100 tab  Pharmacy: [facility] ([place])    LOV: 06/23/25  NOV: 9/15/2025    CSA/UDS: 8/25/24    Prescription entered and sent to provider to renew.    Diana Arizmendi RN

## 2025-08-05 ENCOUNTER — APPOINTMENT (OUTPATIENT)
Dept: CARDIOLOGY | Facility: CLINIC | Age: 70
End: 2025-08-05
Payer: MEDICARE

## 2025-08-12 ENCOUNTER — CLINICAL SUPPORT (OUTPATIENT)
Dept: PREADMISSION TESTING | Facility: HOSPITAL | Age: 70
End: 2025-08-12
Payer: MEDICARE

## 2025-08-19 ENCOUNTER — PRE-ADMISSION TESTING (OUTPATIENT)
Dept: PREADMISSION TESTING | Facility: HOSPITAL | Age: 70
End: 2025-08-19
Payer: MEDICARE

## 2025-08-19 VITALS
HEIGHT: 71 IN | BODY MASS INDEX: 26.5 KG/M2 | SYSTOLIC BLOOD PRESSURE: 135 MMHG | OXYGEN SATURATION: 99 % | TEMPERATURE: 97.1 F | HEART RATE: 60 BPM | DIASTOLIC BLOOD PRESSURE: 71 MMHG | WEIGHT: 189.3 LBS

## 2025-08-19 DIAGNOSIS — Z01.818 PREOPERATIVE TESTING: ICD-10-CM

## 2025-08-19 DIAGNOSIS — Z22.7 LATENT TUBERCULOSIS BY BLOOD TEST: ICD-10-CM

## 2025-08-19 DIAGNOSIS — J84.9 INTERSTITIAL PULMONARY DISEASE (MULTI): ICD-10-CM

## 2025-08-19 DIAGNOSIS — I71.40 ABDOMINAL AORTIC ANEURYSM (AAA) WITHOUT RUPTURE, UNSPECIFIED PART: Primary | ICD-10-CM

## 2025-08-19 LAB
ABO GROUP (TYPE) IN BLOOD: NORMAL
ANION GAP SERPL CALC-SCNC: 14 MMOL/L (ref 10–20)
ANTIBODY SCREEN: NORMAL
APTT PPP: 32 SECONDS (ref 26–36)
BASOPHILS # BLD AUTO: 0.11 X10*3/UL (ref 0–0.1)
BASOPHILS NFR BLD AUTO: 1.1 %
BUN SERPL-MCNC: 11 MG/DL (ref 6–23)
CALCIUM SERPL-MCNC: 9.1 MG/DL (ref 8.6–10.6)
CHLORIDE SERPL-SCNC: 102 MMOL/L (ref 98–107)
CO2 SERPL-SCNC: 28 MMOL/L (ref 21–32)
CREAT SERPL-MCNC: 0.71 MG/DL (ref 0.5–1.3)
EGFRCR SERPLBLD CKD-EPI 2021: >90 ML/MIN/1.73M*2
EOSINOPHIL # BLD AUTO: 0.19 X10*3/UL (ref 0–0.4)
EOSINOPHIL NFR BLD AUTO: 2 %
ERYTHROCYTE [DISTWIDTH] IN BLOOD BY AUTOMATED COUNT: 14.5 % (ref 11.5–14.5)
GLUCOSE SERPL-MCNC: 82 MG/DL (ref 74–99)
HCT VFR BLD AUTO: 45.2 % (ref 41–52)
HGB BLD-MCNC: 14.6 G/DL (ref 13.5–17.5)
IMM GRANULOCYTES # BLD AUTO: 0.03 X10*3/UL (ref 0–0.5)
IMM GRANULOCYTES NFR BLD AUTO: 0.3 % (ref 0–0.9)
INR PPP: 0.9 (ref 0.9–1.1)
LYMPHOCYTES # BLD AUTO: 2.52 X10*3/UL (ref 0.8–3)
LYMPHOCYTES NFR BLD AUTO: 26.1 %
MCH RBC QN AUTO: 29.1 PG (ref 26–34)
MCHC RBC AUTO-ENTMCNC: 32.3 G/DL (ref 32–36)
MCV RBC AUTO: 90 FL (ref 80–100)
MONOCYTES # BLD AUTO: 0.9 X10*3/UL (ref 0.05–0.8)
MONOCYTES NFR BLD AUTO: 9.3 %
NEUTROPHILS # BLD AUTO: 5.89 X10*3/UL (ref 1.6–5.5)
NEUTROPHILS NFR BLD AUTO: 61.2 %
NRBC BLD-RTO: 0 /100 WBCS (ref 0–0)
PLATELET # BLD AUTO: 180 X10*3/UL (ref 150–450)
POTASSIUM SERPL-SCNC: 4.4 MMOL/L (ref 3.5–5.3)
PROTHROMBIN TIME: 10.4 SECONDS (ref 9.8–12.4)
RBC # BLD AUTO: 5.01 X10*6/UL (ref 4.5–5.9)
RH FACTOR (ANTIGEN D): NORMAL
SODIUM SERPL-SCNC: 140 MMOL/L (ref 136–145)
WBC # BLD AUTO: 9.6 X10*3/UL (ref 4.4–11.3)

## 2025-08-19 PROCEDURE — 36415 COLL VENOUS BLD VENIPUNCTURE: CPT

## 2025-08-19 PROCEDURE — 99205 OFFICE O/P NEW HI 60 MIN: CPT

## 2025-08-19 PROCEDURE — 85025 COMPLETE CBC W/AUTO DIFF WBC: CPT

## 2025-08-19 PROCEDURE — 86900 BLOOD TYPING SEROLOGIC ABO: CPT

## 2025-08-19 PROCEDURE — 99406 BEHAV CHNG SMOKING 3-10 MIN: CPT

## 2025-08-19 PROCEDURE — 85610 PROTHROMBIN TIME: CPT

## 2025-08-19 PROCEDURE — 80048 BASIC METABOLIC PNL TOTAL CA: CPT

## 2025-08-19 RX ORDER — CHLORHEXIDINE GLUCONATE 40 MG/ML
SOLUTION TOPICAL
Qty: 473 ML | Refills: 0 | Status: SHIPPED | OUTPATIENT
Start: 2025-08-19

## 2025-08-19 RX ORDER — CHLORHEXIDINE GLUCONATE ORAL RINSE 1.2 MG/ML
SOLUTION DENTAL
Qty: 120 ML | Refills: 0 | Status: SHIPPED | OUTPATIENT
Start: 2025-08-19

## 2025-08-19 ASSESSMENT — DUKE ACTIVITY SCORE INDEX (DASI)
CAN YOU WALK INDOORS, SUCH AS AROUND YOUR HOUSE: YES
CAN YOU DO YARD WORK LIKE RAKING LEAVES, WEEDING OR PUSHING A MOWER: YES
CAN YOU DO HEAVY WORK AROUND THE HOUSE LIKE SCRUBBING FLOORS OR LIFTING AND MOVING HEAVY FURNITURE: YES
DASI METS SCORE: 8.2
CAN YOU RUN A SHORT DISTANCE: YES
CAN YOU DO LIGHT WORK AROUND THE HOUSE LIKE DUSTING OR WASHING DISHES: YES
CAN YOU CLIMB A FLIGHT OF STAIRS OR WALK UP A HILL: YES
CAN YOU TAKE CARE OF YOURSELF (EAT, DRESS, BATHE, OR USE TOILET): YES
CAN YOU PARTICIPATE IN MODERATE RECREATIONAL ACTIVITIES LIKE GOLF, BOWLING, DANCING, DOUBLES TENNIS OR THROWING A BASEBALL OR FOOTBALL: NO
CAN YOU DO MODERATE WORK AROUND THE HOUSE LIKE VACUUMING, SWEEPING FLOORS OR CARRYING GROCERIES: YES
CAN YOU HAVE SEXUAL RELATIONS: YES
TOTAL_SCORE: 44.7
CAN YOU PARTICIPATE IN STRENOUS SPORTS LIKE SWIMMING, SINGLES TENNIS, FOOTBALL, BASKETBALL, OR SKIING: NO
CAN YOU WALK A BLOCK OR TWO ON LEVEL GROUND: YES

## 2025-08-19 ASSESSMENT — ENCOUNTER SYMPTOMS
NECK PAIN: 1
NUMBNESS: 1
CARDIOVASCULAR NEGATIVE: 1
GASTROINTESTINAL NEGATIVE: 1
EYES NEGATIVE: 1
ENDOCRINE NEGATIVE: 1
NECK STIFFNESS: 1
ARTHRALGIAS: 1
SINUS CONGESTION: 1
BRUISES/BLEEDS EASILY: 1
RESPIRATORY NEGATIVE: 1
CONSTITUTIONAL NEGATIVE: 1

## 2025-08-19 ASSESSMENT — LIFESTYLE VARIABLES: SMOKING_STATUS: SMOKER

## 2025-08-20 DIAGNOSIS — I71.43 INFRARENAL ABDOMINAL AORTIC ANEURYSM (AAA) WITHOUT RUPTURE: Primary | ICD-10-CM

## 2025-08-22 DIAGNOSIS — M13.80 SERONEGATIVE INFLAMMATORY ARTHRITIS: ICD-10-CM

## 2025-08-22 DIAGNOSIS — M47.817 LUMBOSACRAL SPONDYLOSIS WITHOUT MYELOPATHY: ICD-10-CM

## 2025-08-22 RX ORDER — OXYCODONE AND ACETAMINOPHEN 7.5; 325 MG/1; MG/1
1 TABLET ORAL EVERY 6 HOURS PRN
Qty: 100 TABLET | Refills: 0 | Status: SHIPPED | OUTPATIENT
Start: 2025-08-22 | End: 2025-09-21

## 2025-09-02 ENCOUNTER — APPOINTMENT (OUTPATIENT)
Dept: VASCULAR SURGERY | Facility: HOSPITAL | Age: 70
End: 2025-09-02
Payer: MEDICARE

## 2025-09-15 ENCOUNTER — APPOINTMENT (OUTPATIENT)
Dept: RHEUMATOLOGY | Facility: CLINIC | Age: 70
End: 2025-09-15
Payer: MEDICARE

## 2026-07-02 ENCOUNTER — APPOINTMENT (OUTPATIENT)
Dept: NEUROLOGY | Facility: CLINIC | Age: 71
End: 2026-07-02
Payer: MEDICARE